# Patient Record
Sex: FEMALE | Race: WHITE | NOT HISPANIC OR LATINO | Employment: OTHER | URBAN - METROPOLITAN AREA
[De-identification: names, ages, dates, MRNs, and addresses within clinical notes are randomized per-mention and may not be internally consistent; named-entity substitution may affect disease eponyms.]

---

## 2019-11-08 ENCOUNTER — OFFICE VISIT (OUTPATIENT)
Dept: MEDICAL GROUP | Facility: MEDICAL CENTER | Age: 64
End: 2019-11-08
Payer: COMMERCIAL

## 2019-11-08 VITALS
RESPIRATION RATE: 16 BRPM | DIASTOLIC BLOOD PRESSURE: 78 MMHG | SYSTOLIC BLOOD PRESSURE: 126 MMHG | BODY MASS INDEX: 27.32 KG/M2 | WEIGHT: 164 LBS | HEIGHT: 65 IN | HEART RATE: 78 BPM | OXYGEN SATURATION: 100 % | TEMPERATURE: 97.1 F

## 2019-11-08 DIAGNOSIS — Z00.00 ANNUAL PHYSICAL EXAM: ICD-10-CM

## 2019-11-08 DIAGNOSIS — R06.83 SNORING: ICD-10-CM

## 2019-11-08 DIAGNOSIS — M79.645 FINGER PAIN, LEFT: ICD-10-CM

## 2019-11-08 DIAGNOSIS — Z12.31 SCREENING MAMMOGRAM, ENCOUNTER FOR: ICD-10-CM

## 2019-11-08 DIAGNOSIS — Z23 NEED FOR VACCINATION: ICD-10-CM

## 2019-11-08 DIAGNOSIS — Z11.59 ENCOUNTER FOR HEPATITIS C SCREENING TEST FOR LOW RISK PATIENT: ICD-10-CM

## 2019-11-08 DIAGNOSIS — M85.80 OSTEOPENIA, UNSPECIFIED LOCATION: ICD-10-CM

## 2019-11-08 DIAGNOSIS — M25.442 SWELLING OF FINGER JOINT OF LEFT HAND: ICD-10-CM

## 2019-11-08 PROCEDURE — 99214 OFFICE O/P EST MOD 30 MIN: CPT | Performed by: NURSE PRACTITIONER

## 2019-11-08 SDOH — HEALTH STABILITY: MENTAL HEALTH: HOW MANY STANDARD DRINKS CONTAINING ALCOHOL DO YOU HAVE ON A TYPICAL DAY?: 1 OR 2

## 2019-11-08 SDOH — HEALTH STABILITY: MENTAL HEALTH: HOW OFTEN DO YOU HAVE A DRINK CONTAINING ALCOHOL?: 4 OR MORE TIMES A WEEK

## 2019-11-08 ASSESSMENT — PATIENT HEALTH QUESTIONNAIRE - PHQ9: CLINICAL INTERPRETATION OF PHQ2 SCORE: 0

## 2019-11-08 NOTE — ASSESSMENT & PLAN NOTE
Told she snores by her . Her sister and father have sleep apnea.     Used to get frequent headaches/migraines, stopped at age 52.     No daytime somnolence.     Only wakes at night to go to the bathroom, no frequent waking. Snoring doesn't wake her.

## 2019-11-08 NOTE — ASSESSMENT & PLAN NOTE
Ongoing for a few months, new to me today. Left middle PIP joint intermittently swollen and painful. No redness, not hot to touch. No family history of RA. Does have raynaud's disease and family history of autoimmune diseases.

## 2019-11-09 NOTE — PROGRESS NOTES
"Subjective:   Mey Araujo is a 64 y.o. female here today to establish care and discuss the following:    Finger pain, left  Ongoing for a few months, new to me today. Left middle PIP joint intermittently swollen and painful. No redness, not hot to touch. No family history of RA. Does have raynaud's disease and family history of autoimmune diseases.     Snoring  Told she snores by her . Her sister and father have sleep apnea.     Used to get frequent headaches/migraines, stopped at age 52.     No daytime somnolence.     Only wakes at night to go to the bathroom, no frequent waking. Snoring doesn't wake her.        Current medicines (including changes today)  Current Outpatient Medications   Medication Sig Dispense Refill   • Zoster Vac Recomb Adjuvanted (SHINGRIX) 50 MCG/0.5ML Recon Susp 0.5 mL by Intramuscular route Once for 1 dose. 0.5 mL 0   • albuterol (VENTOLIN OR PROVENTIL) 108 (90 BASE) MCG/ACT Aero Soln inhalation aerosol Inhale 2 Puffs by mouth every 6 hours as needed for Shortness of Breath. 8.5 g 3     No current facility-administered medications for this visit.      She  has no past medical history on file.    ROS   No chest pain, no shortness of breath, no abdominal pain  Positive ROS as per HPI.  All other systems reviewed and are negative.     Objective:     /78 (BP Location: Right arm, Patient Position: Sitting, BP Cuff Size: Adult)   Pulse 78   Temp 36.2 °C (97.1 °F) (Temporal)   Resp 16   Ht 1.638 m (5' 4.5\")   Wt 74.4 kg (164 lb)   SpO2 100%  Body mass index is 27.72 kg/m².     Physical Exam:  Constitutional: Alert, no distress.  Skin: Warm, dry, good turgor, no rashes in visible areas.  Eye: Equal, round, conjunctiva clear, lids normal.  ENMT: Lips without lesions, good dentition  Neck: Trachea midline  Respiratory: Unlabored respiratory effort  Cardiovascular: No edema.  Psych: Alert and oriented x3, normal affect and mood.  MSK    Assessment and Plan:   The following " treatment plan was discussed    1. Snoring  Unstable  Check apnea link    2. Finger pain, left  Unstable  Check labs  OA vs RA  - URIC ACID; Future  - RHEUMATOID ARTHRITIS FACTOR; Future  - CCP ANTIBODY; Future  - WESTERGREN SED RATE; Future  - CRP QUANTITIVE (NON-CARDIAC); Future    3. Swelling of finger joint of left hand  As above,check labs  Slight deformity of left middle finger PIP joint  - URIC ACID; Future  - RHEUMATOID ARTHRITIS FACTOR; Future  - CCP ANTIBODY; Future  - WESTERGREN SED RATE; Future  - CRP QUANTITIVE (NON-CARDIAC); Future    4. Osteopenia, unspecified location  Bone density done in 2007 had normal T scores but stated that patient is osteopenic.   Unsure if T score was accidentally reported as a positive result and not negative or if report was abnormal.   Recheck DEXA, patient went through menopause in her 20s, does not take vit d or calcium.   - DS-BONE DENSITY STUDY (DEXA); Future    5. Annual physical exam  - CBC WITH DIFFERENTIAL; Future  - Comp Metabolic Panel; Future  - Lipid Profile; Future  - TSH WITH REFLEX TO FT4; Future  - VITAMIN D,25 HYDROXY; Future  - URIC ACID; Future  - RHEUMATOID ARTHRITIS FACTOR; Future  - CCP ANTIBODY; Future  - WESTERGREN SED RATE; Future  - CRP QUANTITIVE (NON-CARDIAC); Future    6. Encounter for hepatitis C screening test for low risk patient  - HEP C VIRUS ANTIBODY; Future    7. Screening mammogram, encounter for  - MA-SCREEN MAMMO W/CAD-BILAT; Future    8. Need for vaccination  - Zoster Vac Recomb Adjuvanted (SHINGRIX) 50 MCG/0.5ML Recon Susp; 0.5 mL by Intramuscular route Once for 1 dose.  Dispense: 0.5 mL; Refill: 0      Followup: Return in about 4 weeks (around 12/6/2019).    I have placed the below orders and discussed them with an approved delegating provider. The MA is performing the below orders under the direction of Dr. Santo

## 2019-12-06 ENCOUNTER — OFFICE VISIT (OUTPATIENT)
Dept: MEDICAL GROUP | Facility: MEDICAL CENTER | Age: 64
End: 2019-12-06
Payer: COMMERCIAL

## 2019-12-06 VITALS
TEMPERATURE: 96.6 F | SYSTOLIC BLOOD PRESSURE: 118 MMHG | HEART RATE: 80 BPM | BODY MASS INDEX: 27.66 KG/M2 | DIASTOLIC BLOOD PRESSURE: 78 MMHG | WEIGHT: 166 LBS | RESPIRATION RATE: 16 BRPM | HEIGHT: 65 IN | OXYGEN SATURATION: 98 %

## 2019-12-06 DIAGNOSIS — Z23 NEED FOR VACCINATION: ICD-10-CM

## 2019-12-06 DIAGNOSIS — E55.9 VITAMIN D INSUFFICIENCY: ICD-10-CM

## 2019-12-06 DIAGNOSIS — G47.34 NOCTURNAL HYPOXIA: ICD-10-CM

## 2019-12-06 DIAGNOSIS — R06.83 SNORING: ICD-10-CM

## 2019-12-06 PROCEDURE — 90715 TDAP VACCINE 7 YRS/> IM: CPT | Performed by: NURSE PRACTITIONER

## 2019-12-06 PROCEDURE — 99214 OFFICE O/P EST MOD 30 MIN: CPT | Mod: 25 | Performed by: NURSE PRACTITIONER

## 2019-12-06 PROCEDURE — 90471 IMMUNIZATION ADMIN: CPT | Performed by: NURSE PRACTITIONER

## 2019-12-06 NOTE — PROGRESS NOTES
"Subjective:   Mey Araujo is a 64 y.o. female here today for lab review and to overnight oximetry review:    Nocturnal hypoxia  Recent overnight pulse ox showed saturations decreased, as low as 68%.   Time less than 89% SpO2 43%, less than 88% SpO2 27%.     Possible sleep apnea vs other sleep disturbance. Needs referral to sleep medicine.     Both her father and brother have diagnosed sleep apnea.     Vitamin D insufficiency  Recent labs show Vit D level of 27. Not currently taking a vitamin d or calcium supplement. Does have a history of osteopenia on DEXA.       Current medicines (including changes today)  Current Outpatient Medications   Medication Sig Dispense Refill   • albuterol (VENTOLIN OR PROVENTIL) 108 (90 BASE) MCG/ACT Aero Soln inhalation aerosol Inhale 2 Puffs by mouth every 6 hours as needed for Shortness of Breath. 8.5 g 3     No current facility-administered medications for this visit.      She  has a past medical history of Osteopenia (11/8/2019).    ROS   No chest pain, no shortness of breath, no abdominal pain  Positive ROS as per HPI.  All other systems reviewed and are negative.     Objective:     /78 (BP Location: Right arm, Patient Position: Sitting, BP Cuff Size: Adult)   Pulse 80   Temp 35.9 °C (96.6 °F) (Temporal)   Resp 16   Ht 1.638 m (5' 4.5\")   Wt 75.3 kg (166 lb)   SpO2 98%  Body mass index is 28.05 kg/m².     Physical Exam:  Constitutional: Alert, no distress.  Skin: Warm, dry, good turgor, no rashes in visible areas.  Eye: Equal, round , conjunctiva clear, lids normal.  ENMT: Lips without lesions, good dentition  Neck: Trachea midline  Respiratory: Unlabored respiratory effort  Cardiovascular: No edema.  Psych: Alert and oriented x3, normal affect and mood.      Assessment and Plan:   The following treatment plan was discussed    1. Nocturnal hypoxia  Unstable  Follow-up with sleep specialist for sleep study  - REFERRAL TO SLEEP STUDIES    2. " Snoring  Unstable  Follow-up with sleep specialist for sleep study  - REFERRAL TO SLEEP STUDIES    3. Need for vaccination  - Tdap =>6yo IM    4. Vitamin D insufficiency  Unstable  Advised starting daily vitamin D and calcium supplement due to postmenopausal status and history of osteopenia.      Followup: Return in about 4 weeks (around 1/3/2020) for Pap Smear.    I have placed the below orders and discussed them with an approved delegating provider. The MA is performing the below orders under the direction of Dr. Santo

## 2019-12-06 NOTE — ASSESSMENT & PLAN NOTE
Recent overnight pulse ox showed saturations decreased, as low as 68%.   Time less than 89% SpO2 43%, less than 88% SpO2 27%.     Possible sleep apnea vs other sleep disturbance. Needs referral to sleep medicine.     Both her father and brother have diagnosed sleep apnea.

## 2019-12-06 NOTE — ASSESSMENT & PLAN NOTE
Recent labs show Vit D level of 27. Not currently taking a vitamin d or calcium supplement. Does have a history of osteopenia on DEXA.

## 2019-12-17 ENCOUNTER — OFFICE VISIT (OUTPATIENT)
Dept: MEDICAL GROUP | Facility: MEDICAL CENTER | Age: 64
End: 2019-12-17
Payer: COMMERCIAL

## 2019-12-17 VITALS
SYSTOLIC BLOOD PRESSURE: 128 MMHG | RESPIRATION RATE: 16 BRPM | WEIGHT: 162 LBS | TEMPERATURE: 97 F | OXYGEN SATURATION: 99 % | BODY MASS INDEX: 27.38 KG/M2 | HEART RATE: 63 BPM | DIASTOLIC BLOOD PRESSURE: 82 MMHG

## 2019-12-17 DIAGNOSIS — Z12.4 PAP SMEAR FOR CERVICAL CANCER SCREENING: ICD-10-CM

## 2019-12-17 PROCEDURE — 99396 PREV VISIT EST AGE 40-64: CPT | Performed by: NURSE PRACTITIONER

## 2019-12-17 NOTE — PROGRESS NOTES
SUBJECTIVE: 64 y.o. female for annual routine gynecologic exam  Chief Complaint   Patient presents with   • Gynecologic Exam       OB History    Para Term  AB Living   0 0 0 0 0 0   SAB TAB Ectopic Molar Multiple Live Births   0 0 0 0 0 0      Last Pap: 2014  Social History     Substance and Sexual Activity   Sexual Activity Not on file     Sexual history: currently sexually active, single partner, contraceptive--post menopausal status   H/O Abnormal Pap no  She  reports that she has never smoked. She has never used smokeless tobacco.        Allergies: Pcn [penicillins] and Hydrocodone     ROS:    Reports no menopause symptoms of hot flashes, night sweats, sleep disruption, mood changes.Denies vaginal dryness.     No significant bloating/fluid retention, pelvic pain, or dyspareunia. No vaginal discharge   No breast tenderness, dimpling, mass, nipple discharge, skin changes, changes in size or contour, or abnormal cyclic discomfort.  No urinary tract symptoms, no incontinence, no polydipsia, polyuria,  No abdominal pain, change in bowel habits, black or bloody stools.    No unusual headaches, no visual changes, menstrual migraines   No prolonged cough. No dyspnea or chest pain on exertion.  No depression, labile mood, anxiety, libido changes, insomnia.  No temperature intolerance.  No new/concerning skin lesions, concerns.    Exercise: sporadic irregular exercise  Preventive Care: Up to date    Current medicines (including changes today)  Current Outpatient Medications   Medication Sig Dispense Refill   • albuterol (VENTOLIN OR PROVENTIL) 108 (90 BASE) MCG/ACT Aero Soln inhalation aerosol Inhale 2 Puffs by mouth every 6 hours as needed for Shortness of Breath. 8.5 g 3     No current facility-administered medications for this visit.      She  has a past medical history of Osteopenia (2019).  She  has a past surgical history that includes nephrolithotomy and tonsillectomy and adenoidectomy.      Family History:   Family History   Problem Relation Age of Onset   • Cancer Father         prostate ca, skin cancer.    • Lung Disease Father         pulmonary fibrosis   • Other Sister         sleep apnea   • Psychiatric Illness Sister         depression       OBJECTIVE:   /82 (BP Location: Right arm, Patient Position: Sitting, BP Cuff Size: Adult)   Pulse 63   Temp 36.1 °C (97 °F) (Temporal)   Resp 16   Wt 73.5 kg (162 lb)   SpO2 99%   BMI 27.38 kg/m²   Body mass index is 27.38 kg/m².    General/Constitutional: Vitals as above, Well nourished, well developed female in no acute distress  HEAD AND NECK:  Ears normal.  Throat, oral cavity and tongue normal.  Neck supple. No adenopathy or masses in the neck or supraclavicular regions.  No carotid bruits. No thyromegaly.   PSYCH: Judgment grossly appropriate, no apparent depression/anxiety  NEURO: Cranial nerves are normal. DTR's normal and symmetric.    CHEST:  Clear, good air entry, no wheezes or rales.   HEART:  Regular rate and rhythm.  S1 and S2 normal.  No edema or JVD.   ABDOMEN:  Soft without tenderness, guarding, mass or organomegaly.  No CVA tenderness or inguinal adenopathy.   Genitourinary: Grossly normal external female genitalia with skin within normal limits,   EXTREMITIES:  Extremities, reflexes and peripheral pulses are normal.   SKIN: color normal, vascularity normal, no edema, temperature normal   No rashes or suspicious skin lesions noted.     Breast Exam: Symmetrical, normal consistency without masses., No dimpling or skin changes, Normal nipples without discharge, no axillary lymphadenopathy  Pelvic Exam -  Normal external genitalia with no lesions. Vaginal Mucosa:  atrophic vaginal mucosa, normal discharge . no adnexal masses or tenderness, no cervical motion tenderness, no uterine tenderness, small speculum used with warm water as lubrication, Cervix well visualized with no discharge/friability/bleeding, cervical broom used to  obtain cervical specimen, post-specimen collection demonstrated slight bleeding, Cervix with no visible lesions. Thin Prep Pap is obtained, vaginal swab is not obtained and specimen(s) sent to lab  Rectal: deferred    <ASSESSMENT and PLAN>  1. Pap smear for cervical cancer screening  Pap complete, call with results  Labs and apnea link reviewed, she will increase vitamin D and calcium Intake daily  Apt scheduled with [facility] pulmonology in April  Mammogram and Dexa scheduled in December.   THINPREP PAP WITH HPV       Discussed  breast self exam, mammography screening, adequate intake of calcium and vitamin D, diet and exercise   Follow-up in 5 years for next Gyn exam and 5 years for next Pap.   Next office visit for recheck of chronic medical conditions is due in  1 year

## 2020-01-30 ENCOUNTER — HOSPITAL ENCOUNTER (OUTPATIENT)
Dept: RADIOLOGY | Facility: MEDICAL CENTER | Age: 65
End: 2020-01-30
Attending: NURSE PRACTITIONER
Payer: COMMERCIAL

## 2020-01-30 DIAGNOSIS — M85.80 OSTEOPENIA, UNSPECIFIED LOCATION: ICD-10-CM

## 2020-01-30 DIAGNOSIS — Z12.31 SCREENING MAMMOGRAM, ENCOUNTER FOR: ICD-10-CM

## 2020-01-30 PROCEDURE — 77080 DXA BONE DENSITY AXIAL: CPT

## 2020-01-30 PROCEDURE — 77067 SCR MAMMO BI INCL CAD: CPT

## 2020-03-11 ENCOUNTER — PATIENT MESSAGE (OUTPATIENT)
Dept: MEDICAL GROUP | Facility: MEDICAL CENTER | Age: 65
End: 2020-03-11

## 2020-03-11 DIAGNOSIS — A60.04 HERPES SIMPLEX VULVOVAGINITIS: ICD-10-CM

## 2020-03-11 RX ORDER — VALACYCLOVIR HYDROCHLORIDE 500 MG/1
500 TABLET, FILM COATED ORAL 2 TIMES DAILY
Qty: 18 TAB | Refills: 1 | Status: SHIPPED | OUTPATIENT
Start: 2020-03-11 | End: 2020-03-17 | Stop reason: SDUPTHER

## 2020-03-17 ENCOUNTER — TELEPHONE (OUTPATIENT)
Dept: MEDICAL GROUP | Facility: MEDICAL CENTER | Age: 65
End: 2020-03-17

## 2020-03-17 DIAGNOSIS — A60.04 HERPES SIMPLEX VULVOVAGINITIS: ICD-10-CM

## 2020-03-17 RX ORDER — VALACYCLOVIR HYDROCHLORIDE 500 MG/1
500 TABLET, FILM COATED ORAL 2 TIMES DAILY
Qty: 18 TAB | Refills: 1 | Status: SHIPPED | OUTPATIENT
Start: 2020-03-17 | End: 2021-02-22

## 2020-03-17 NOTE — TELEPHONE ENCOUNTER
----- Message from Sheri Glasgow, Med Ass't sent at 3/16/2020  1:58 PM PDT -----  Regarding: FW: Prescription Question  Contact: [phone]    ----- Message -----  From: Mey yHde  Sent: 3/16/2020  12:44 PM PDT  To: Pilar Tang  Subject: Prescription Question                            Hi, I'm not sure you got my last message. I couldn't  the Valcyclovir from [facility] as they are not a [facility] provider and the RX was $150!  Please call it in to [facility].  Thank you,  Ludmila Hyde

## 2020-05-12 PROBLEM — Z78.9 NON-SMOKER: Status: ACTIVE | Noted: 2020-05-12

## 2020-05-12 PROBLEM — G47.33 OSA (OBSTRUCTIVE SLEEP APNEA): Status: ACTIVE | Noted: 2020-05-12

## 2020-05-12 NOTE — PROGRESS NOTES
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  She had a sleep study by [facility] in 2010 which showed an AHI of 6.8.  A November 2019 overnight oximetry demonstrated saturations less than 90% for 43.5% of the recording with a bijal saturation of 68%.  Her oxygen desaturation index was 16.6.      Significant comorbidities and modifying factors include never smoker, near normal BMI, kidney stones, and pneumonia.      Symptom Summary:  Snoring: +  Very loud snoring: +  Witnessed apneas: +  Resuscitative snorts: +  Nocturnal shortness of breath: +  Non-restorative sleep: +  Insomnia: +  Nocturnal awakenings: +  Nocturia: +  EDS: +  Fatigue: +  Tiredness: +  Falls asleep accidentally: +   Napping or returning to bed after arising: +  Restless legs: -  Limb movements during sleep: +  Nocturnal headaches: +          Patient Active Problem List    Diagnosis Date Noted   • Nocturnal hypoxia 12/06/2019   • Vitamin D insufficiency 12/06/2019   • Snoring 11/08/2019   • Finger pain, left 11/08/2019   • Swelling of finger joint of left hand 11/08/2019   • Osteopenia 11/08/2019   • Preventative health care 04/06/2016   • History of kidney stones 04/06/2016   • Headache 03/10/2016   • History of pneumonia 03/10/2016       Past Medical History:   Diagnosis Date   • Osteopenia 11/8/2019        Past Surgical History:   Procedure Laterality Date   • NEPHROLITHOTOMY     • TONSILLECTOMY AND ADENOIDECTOMY         Family History   Problem Relation Age of Onset   • Cancer Father         prostate ca, skin cancer.    • Lung Disease Father         pulmonary fibrosis   • Other Sister         sleep apnea   • Psychiatric Illness Sister         depression       Social History     Socioeconomic History   • Marital status:      Spouse name: Not on file   • Number of children: Not on file   • Years of education: Not  "on file   • Highest education level: Not on file   Occupational History   • Not on file   Social Needs   • Financial resource strain: Not on file   • Food insecurity     Worry: Not on file     Inability: Not on file   • Transportation needs     Medical: Not on file     Non-medical: Not on file   Tobacco Use   • Smoking status: Never Smoker   • Smokeless tobacco: Never Used   Substance and Sexual Activity   • Alcohol use: Yes     Frequency: 4 or more times a week     Drinks per session: 1 or 2     Comment: 2 glasses wine 3 d/wk   • Drug use: No   • Sexual activity: Not on file   Lifestyle   • Physical activity     Days per week: Not on file     Minutes per session: Not on file   • Stress: Not on file   Relationships   • Social connections     Talks on phone: Not on file     Gets together: Not on file     Attends [employer] service: Not on file     Active member of club or organization: Not on file     Attends meetings of clubs or organizations: Not on file     Relationship status: Not on file   • Intimate partner violence     Fear of current or ex partner: Not on file     Emotionally abused: Not on file     Physically abused: Not on file     Forced sexual activity: Not on file   Other Topics Concern   • Not on file   Social History Narrative   • Not on file       Current Outpatient Medications   Medication Sig Dispense Refill   • valACYclovir (VALTREX) 500 MG Tab Take 1 Tab by mouth 2 times a day. For 3 days at the start of outbreak. 18 Tab 1   • albuterol (VENTOLIN OR PROVENTIL) 108 (90 BASE) MCG/ACT Aero Soln inhalation aerosol Inhale 2 Puffs by mouth every 6 hours as needed for Shortness of Breath. 8.5 g 3     No current facility-administered medications for this visit.     \"CURRENT RX\"    ALLERGIES: Pcn [penicillins] and Hydrocodone    ROS  ***  Constitutional: Denies fever, chills, sweats,  weight loss, fatigue.  Eyes: Denies vision loss, pain, drainage, double vision, glasses.  Ears/Nose/Mouth/Throat: Denies " earache, difficulty hearing, rhinitis/nasal congestion, injury, recurrent sore throat, persistent hoarseness, decayed teeth/toothaches, ringing or buzzing in the ears.  Cardiovascular: Denies chest pain, tightness, palpitations, swelling in legs/feet, fainting, difficulty breathing when lying down but gets better when sitting up.   Respiratory: Denies shortness of breath, cough, sputum, wheezing, painful breathing, coughing up blood.   Sleep: per HPI  Gastrointestinal: Denies  difficulty swallowing, nausea, abdominal pain, diarrhea, constipation, heartburn.  Genitourinary: Denies  blood in urine, discharge, frequent urination.   Musculoskeletal: Denies painful joints, sore muscles, back pain.   Integumentary: Denies rashes, lumps, color changes.   Neurological: Denies frequent headaches,weakness, dizziness.    PHYSICAL EXAM  ***    There were no vitals taken for this visit.  Appearance: Well-nourished, well-developed, no acute distress  Eyes:  PERRLA, EOMI  ENMT: without lesions, deformities;hearing grossly intact; tongue normal, posterior pharynx without erythema or exudate;   Modified Mallampati (AKA Vang) Score:  Neck: Supple, trachea midline, no masses  Respiratory effort:  No intercostal retractions or use of accessory muscles  Lung auscultation:  No wheezes rhonchi rubs or rales  Cardiac: No murmurs, rubs, or gallops; regular rhythm, normal rate; no edema  Abdomen:  No tenderness, no organomegaly  Musculoskeletal:  Grossly normal; gait and station normal; digits and nails normal  Skin:  No rashes, petechiae, cyanosis  Neurologic: without focal signs; oriented to person, time, place, and purpose; judgement intact  Psychiatric:  No depression, anxiety, agitation        PROBLEMS:  ***  There are no diagnoses linked to this encounter.    PLAN:   The patient has signs and symptoms consistent with obstructive sleep apnea hypopnea syndrome. Will schedule to have a nocturnal polysomnogram using zolpidem to assist  with sleep onset and maintenance should the need arise. Will return after the results are available to determine further diagnostic needs and/or treatment options.    The risks of untreated sleep apnea were discussed with the patient at length. Patients with DONN are at increased risk of cardiovascular disease including coronary artery disease, systemic arterial hypertension, pulmonary arterial hypertension, cardiac arrythmias, and stroke. DONN patients have an increased risk of motor vehicle accidents, type 2 diabetes, chronic kidney disease, and non-alcoholic liver disease. The patient was advised to avoid driving a motor vehicle when drowsy.    Positive airway pressure, such as CPAP, is considered first-line and preferred therapy for sleep apnea and may reverse both symptoms and risks.    Have advised the patient to follow up with the appropriate healthcare practitioners for all other medical problems and issues.        ***  No follow-ups on file.

## 2020-05-14 NOTE — PROGRESS NOTES
"Telemedicine Visit: New Patient     This encounter was conducted via Zoom .   Verbal consent was obtained. Patient's identity was verified.    Subjective:     CC: \"Snoring\"    HPI:  Ms. 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  She had a sleep study by [facility] in 2010 which showed an AHI of 6.8.  A November 2019 overnight oximetry demonstrated saturations less than 90% for 43.5% of the recording with a bijal saturation of 68%.  Her oxygen desaturation index was 16.6.    The patient describes her sleep problem as \"per  I stop breathing.  Snoring almost every night for about 5 years.\"    She generally goes to bed at 11 PM and gets up at 7 AM.  She has a regular bed partner.  Her sleep latency is generally about 2 minutes.  She may read, watch TV or play games on her phone just prior to turn out the lights and attempting to go to sleep.  She experiences 1-2 nocturnal awakenings and reports one episode of nocturia each night.    Symptoms include sometimes difficulty awakening and getting out of bed after sleeping, napping or returning to bed after arising, and tiredness during the day.  She is a loud enough snorer to disturb her .  She reports infrequent restless legs relieved by walking, sleep talking, sleepwalking, and waking up screaming or seemingly afraid.  She reports seeing things like spiders or intruders.    Her father has pulmonary fibrosis and is on oxygen 24/7 as well as CPAP at night..    She may fall asleep accidentally when watching TV.    Her total [place] score is a normal 7 out of 24.    Review of her sleep diary shows napping for of 7 days but she awakened feeling \"good\" each and every morning.    Significant comorbidities and modifying factors include never smoker, near normal BMI, kidney stones, allergic rhinitis, obesity, osteopenia, postnasal drip, and pneumonia " 2018.        ROS  See HPI  Constitutional: Negative for fever, chills and malaise/fatigue.   HENT: Positive for rhinitis or nasal congestion  Eyes: Negative for pain.   Respiratory: Negative for cough and shortness of breath.    Cardiovascular: Negative for leg swelling.   Gastrointestinal: Negative for nausea, vomiting, abdominal pain and diarrhea.   Genitourinary: Negative for dysuria and hematuria.   Skin: Negative for rash.   Neurological: Negative for dizziness, focal weakness and headaches.   Endo/Heme/Allergies: Does not bruise/bleed easily.   Psychiatric/Behavioral: Negative for depression.  The patient is not nervous/anxious.      Allergies   Allergen Reactions   • Pcn [Penicillins] Rash     Rash     • Hydrocodone      Itchy nose         Current medicines (including changes today)  Current Outpatient Medications   Medication Sig Dispense Refill   • valACYclovir (VALTREX) 500 MG Tab Take 1 Tab by mouth 2 times a day. For 3 days at the start of outbreak. 18 Tab 1   • albuterol (VENTOLIN OR PROVENTIL) 108 (90 BASE) MCG/ACT Aero Soln inhalation aerosol Inhale 2 Puffs by mouth every 6 hours as needed for Shortness of Breath. 8.5 g 3     No current facility-administered medications for this visit.        She  has a past medical history of Osteopenia (2019).  She  has a past surgical history that includes nephrolithotomy and tonsillectomy and adenoidectomy.      Family History   Problem Relation Age of Onset   • Cancer Father         prostate ca, skin cancer.    • Lung Disease Father         pulmonary fibrosis   • Other Sister         sleep apnea   • Psychiatric Illness Sister         depression     Family Status   Relation Name Status   • Mo suicide 29yo    • Fa  Alive   • Sis sleep apnea/depression Alive   • Sis  Alive   • Laurie adopted Alive       Patient Active Problem List    Diagnosis Date Noted   • DONN (obstructive sleep apnea) 2020   • Non-smoker 2020   • Nocturnal hypoxia 2019    • Vitamin D insufficiency 12/06/2019   • Snoring 11/08/2019   • Finger pain, left 11/08/2019   • Swelling of finger joint of left hand 11/08/2019   • Osteopenia 11/08/2019   • Preventative health care 04/06/2016   • History of kidney stones 04/06/2016   • Headache 03/10/2016   • History of pneumonia 03/10/2016          Objective:   Vitals obtained by patient:  rr 14  Weight 73.5 kg    Physical Exam:  Constitutional: Alert, no distress, well-groomed.  Skin: No rashes in visible areas.  Eye: Round. Conjunctiva clear, lids normal. No icterus. Glasses.  ENMT: Lips pink without lesions, good dentition, moist mucous membranes. Phonation normal.  Neck: No masses, no thyromegaly. Moves freely without pain.  CV: Pulse as reported by patient  Respiratory: Unlabored respiratory effort, no cough or audible wheeze  Psych: Alert and oriented x3, normal affect and mood.       Assessment and Plan:   The patient has signs and symptoms consistent with obstructive sleep apnea hypopnea syndrome. Will schedule to have a nocturnal polysomnogram using zolpidem to assist with sleep onset and maintenance should the need arise. Will return after the results are available to determine further diagnostic needs and/or treatment options.    The risks of untreated sleep apnea were discussed with the patient at length. Patients with DONN are at increased risk of cardiovascular disease including coronary artery disease, systemic arterial hypertension, pulmonary arterial hypertension, cardiac arrythmias, and stroke. DONN patients have an increased risk of motor vehicle accidents, type 2 diabetes, chronic kidney disease, and non-alcoholic liver disease. The patient was advised to avoid driving a motor vehicle when drowsy.    Positive airway pressure, such as CPAP, is considered first-line and preferred therapy for sleep apnea and may reverse both symptoms and risks.    Have advised the patient to follow up with the appropriate healthcare  practitioners for all other medical problems and issues.      The following treatment plan was discussed:     1. DONN (obstructive sleep apnea)  - zolpidem (AMBIEN) 5 MG Tab; Take 1 Tab by mouth at bedtime as needed for Sleep (1 to 2 po qhs prn insomnia/sleep study. Bring to sleep study.) for up to 2 doses.  Dispense: 2 Tab; Refill: 0  - Polysomnography, 4 or More; Future    2. Snoring    3. Nocturnal hypoxia    4. History of pneumonia    5. History of kidney stones    6. Non-smoker    7. Body mass index is 28.84 kg/m².  My my my my    Follow-up: Return for after sleep study.

## 2020-05-15 ENCOUNTER — PATIENT MESSAGE (OUTPATIENT)
Dept: MEDICAL GROUP | Facility: MEDICAL CENTER | Age: 65
End: 2020-05-15

## 2020-05-15 ENCOUNTER — SLEEP CENTER VISIT (OUTPATIENT)
Dept: SLEEP MEDICINE | Facility: MEDICAL CENTER | Age: 65
End: 2020-05-15
Payer: COMMERCIAL

## 2020-05-15 VITALS — WEIGHT: 168 LBS | BODY MASS INDEX: 28.68 KG/M2 | HEIGHT: 64 IN

## 2020-05-15 DIAGNOSIS — R06.83 SNORING: ICD-10-CM

## 2020-05-15 DIAGNOSIS — Z87.442 HISTORY OF KIDNEY STONES: ICD-10-CM

## 2020-05-15 DIAGNOSIS — Z87.01 HISTORY OF PNEUMONIA: ICD-10-CM

## 2020-05-15 DIAGNOSIS — G47.33 OSA (OBSTRUCTIVE SLEEP APNEA): ICD-10-CM

## 2020-05-15 DIAGNOSIS — Z78.9 NON-SMOKER: ICD-10-CM

## 2020-05-15 DIAGNOSIS — G47.34 NOCTURNAL HYPOXIA: ICD-10-CM

## 2020-05-15 PROCEDURE — 99203 OFFICE O/P NEW LOW 30 MIN: CPT | Mod: 95,CR | Performed by: INTERNAL MEDICINE

## 2020-05-15 RX ORDER — ZOLPIDEM TARTRATE 5 MG/1
5 TABLET ORAL NIGHTLY PRN
Qty: 2 TAB | Refills: 0 | Status: SHIPPED | OUTPATIENT
Start: 2020-05-15 | End: 2020-06-15

## 2020-05-20 ENCOUNTER — TELEPHONE (OUTPATIENT)
Dept: MEDICAL GROUP | Facility: MEDICAL CENTER | Age: 65
End: 2020-05-20

## 2020-05-20 DIAGNOSIS — Z12.4 PAP SMEAR FOR CERVICAL CANCER SCREENING: ICD-10-CM

## 2020-05-20 DIAGNOSIS — G47.34 NOCTURNAL HYPOXIA: ICD-10-CM

## 2020-05-20 NOTE — TELEPHONE ENCOUNTER
1. Caller Name: [facility] Histology Department - [facility]  Call Back Number: [phone]    Spoke with Leela who stated that pap should have been forwarded to [facility] due to patient's insurance at the time ([employer]). It was not received at a [facility] Lab.      1. Caller Name: Allison Coyle  Call Back Number: [phone]    Contacted [facility] who faxed the results, see . Representative verbally stated pap was negative. Please advise.

## 2020-05-20 NOTE — TELEPHONE ENCOUNTER
Great, thanks for checking on that. Can you attach the report to Sheri's order so we can satisfy HM? Thanks

## 2020-05-20 NOTE — TELEPHONE ENCOUNTER
Regarding: RE: Non-Urgent Medical Question  Contact: [phone]  ----- Message from FRANCY Dickson sent at 5/19/2020  4:35 PM PDT -----       ----- Message sent from FRANCY Dickson to Mey Araujo at 5/19/2020  4:35 PM -----   I did some digging - it looks like this was collected and sent to the lab but never resulted (this is why it still shows that you are due in [facility]). I will send  amessage to the [facility] and see if they can contact the lab.     MARIANGEL Trinidad  Family Medicine   Covering for FRANCY Gunn       ----- Message -----       From:Mey Araujo       Sent:5/19/2020  1:31 PM PDT         To:FRANCY Dickson    Subject:Non-Urgent Medical Question    Dr. Baum did it I think in December at the [facility] office.  Ludmila      ----- Message -----       From:FRANCY Dickson       Sent:5/19/2020  1:26 PM PDT         To:Mey Araujo    Subject:RE: Non-Urgent Medical Question    Myles Mathias,     When did you have the PAP done and where? I would like a copy for your chart.     MARIANGEL Trinidad  [facility]   Covering for FRANCY Gunn       ----- Message -----       From:Mey Araujo       Sent:5/15/2020  9:05 AM PDT         To:FRANCY Gunn    Subject:Non-Urgent Medical Question    Can you please enter my Pap smear date?  It says I still have to do it.      I saw Dr. Payton today via Zoom for my sleep study consult. I am going to be scheduled for an in-office sleep study, diagnose/treat soon.  Ludmila

## 2020-05-31 ENCOUNTER — SLEEP STUDY (OUTPATIENT)
Dept: SLEEP MEDICINE | Facility: MEDICAL CENTER | Age: 65
End: 2020-05-31
Attending: INTERNAL MEDICINE
Payer: COMMERCIAL

## 2020-05-31 DIAGNOSIS — G47.33 OSA (OBSTRUCTIVE SLEEP APNEA): ICD-10-CM

## 2020-05-31 PROCEDURE — 95811 POLYSOM 6/>YRS CPAP 4/> PARM: CPT | Performed by: INTERNAL MEDICINE

## 2020-06-02 NOTE — PROCEDURES
Clinical Comments:   The patient underwent a split night polysomnogram with a CPAP titration using the standard montage for measurement of paramaters of sleep, respiratory events, movement abnormalities, heart rate and rhythm. A Microphone was used to monitior snoring.          DIAGNOSTIC:  Analysis:  Study start time was 10:38:40 PM. Total recording time was 2h 28.5m (148 minutes) with a total sleep time of 2h 5.5m (125 minutes) resulting in a sleep efficiency of 84.51%.  Sleep latency from the start fo the study was 17 minutes minutes and REM latency from sleep onset was 00 minutes minutes.  Respiratory:   There were 65 apneas in total consisting of 62 obstructive apneas, 0 mixed apneas, and 3 central apneas. There were 59 hypopneas in total.  The apnea index was 31.08 per hour and the hypopnea index was 28.21 per hour.  The overall AHI was 59.3, with a REM AHI of 0.00, and a supine AHI of 0.00.  Limb Movements:  There were a total of 14 periodic leg movements, of which 9 were PLMS arousals. This resulted in a PLMS index of 6.7 and a PLMS arousal index of 4.3  Oximetry:  The mean SaO2 was 89.0% for the diagnostic portion of the study, with a minimum SaO2 of 73.0%.             TREATMENT:  Analysis:  Treatment recording time was 4h 23.0m (263 minutes) with a total sleep time of 3h 38.5m (218 minutes) resulting in a sleep efficiency of 83.1%.   Sleep latency from the start of treatment was 08 minutes minutes and REM latency from sleep onset was 0h 10.5m minutes.   The patient had 77 arousals in total for an arousal index of 21.1.  Respiratory:   There were 48 apneas in total consisting of 8 obstructive apneas, 40 central apneas, and 0 mixed apneas for an apnea index of 13.18.   The patient had 30 hypopneas in total, which resulted in a hypopnea index of 8.24.   The overall AHI was 21.42, with a REM AHI of 16.63, and a supine AHI of 13.42.   Limb Movements:  There were a total of 150 periodic leg movements, of which  39 were PLMS arousals. This resulted in a PLMS index of 41.2 and a PLMS arousal index of 10.7.  Oximetry:  The mean SaO2 during treatment was 95.0%, with a minimum oxygen saturation of 68.0%.      Interpretation:    Ms. Araujo presented with snoring, witnessed nocturnal apnea and nocturnal hypoxemia.  This is a split-night study.    In the diagnostic phase there is mild fragmentation of sleep with an increased arousal index and no REM sleep time was recorded.  There are frequent periodic limb movements and the periodic limb movement with arousal index is 16.7 events per hour.  The apnea hypopnea index is 59.3 events per hour including obstructive apnea and hypopnea events with very rare central apneas.  No supine sleep time was recorded.  The lowest arterial oxygen saturation is 73% on room air and she spends more than half of the diagnostic time with a saturation below 90%.  The heart rate varies from 62-91 beats per minutes.  Frequent moderate snoring was recorded.    In the treatment phase of the study there is again mild fragmentation of sleep but 50 minutes of REM sleep time are included.  The periodic limb movements persist and continue to contribute to sleep fragmentation.  CPAP was applied at 5 to 14 cm water pressure.  The higher pressures were used to address some persisting hypopnea events.  In the final pressure levels a number of central apnea episodes emerged, suggesting an overtreatment effect.  Overall there were 40 episodes of central apnea, 8 episodes of obstructive apnea and 30 episodes of hypopnea.  At a pressure of 11 cmH2O the apnea hypopnea index was 0 with a lowest arterial oxygen saturation of 93%.  That stage in the titration included 18 minutes of REM sleep time but no supine body position time.    Assessment:  Very severe obstructive sleep apnea hypopnea with an apnea hypopnea index of 59.3 events per hour.  Severe nocturnal hypoxemia with a lowest arterial oxygen saturation of 73% on  room air.  Fragmentation of sleep related to the breathing events and to periodic limb movements.  There is an excellent response to CPAP therapy.  Some treatment emergent central apneas are identified at the end of the CPAP titration suggesting an over titration effect but complex sleep apnea cannot be excluded.    Recommendations:  Clinical correlation is required.  If the patient is felt to be at high risk for complex sleep apnea dedicated titration polysomnography might be considered with the use of BiPAP or ASV therapy as needed.  The patient's history suggests obstructive sleep apnea hypopnea syndrome and CPAP at 11 cm of water should be effective.  She did best with a medium Damaris View mask.

## 2020-06-04 ENCOUNTER — TELEMEDICINE (OUTPATIENT)
Dept: SLEEP MEDICINE | Facility: MEDICAL CENTER | Age: 65
End: 2020-06-04
Payer: COMMERCIAL

## 2020-06-04 VITALS
SYSTOLIC BLOOD PRESSURE: 123 MMHG | BODY MASS INDEX: 29.02 KG/M2 | DIASTOLIC BLOOD PRESSURE: 91 MMHG | HEIGHT: 64 IN | HEART RATE: 71 BPM | WEIGHT: 170 LBS

## 2020-06-04 DIAGNOSIS — G47.33 OSA (OBSTRUCTIVE SLEEP APNEA): ICD-10-CM

## 2020-06-04 PROCEDURE — 99213 OFFICE O/P EST LOW 20 MIN: CPT | Mod: 95,CR | Performed by: NURSE PRACTITIONER

## 2020-06-04 NOTE — PROGRESS NOTES
"Telemedicine Visit: Established Patient     This encounter was conducted via Zoom .   Verbal consent was obtained. Patient's identity was verified.    Subjective:   CC: Sleep study results    Mey Araujo is a 65 y.o. female presenting today for sleep study results.     PSG split night study results from 5/31/20 were reviewed with the patient today which showed Very severe obstructive sleep apnea hypopnea with an apnea hypopnea index of 59.3 events per hour.  Severe nocturnal hypoxemia with a lowest arterial oxygen saturation of 73% on room air.  Fragmentation of sleep related to the breathing events and to periodic limb movements.  There is an excellent response to CPAP therapy.  Some treatment emergent central apneas are identified at the end of the CPAP titration suggesting an over titration effect but complex sleep apnea cannot be excluded. CPAP was applied at 5 to 14 cm water pressure.  The higher pressures were used to address some persisting hypopnea events.  In the final pressure levels a number of central apnea episodes emerged, suggesting an overtreatment effect.  Overall there were 40 episodes of central apnea, 8 episodes of obstructive apnea and 30 episodes of hypopnea.  At a pressure of 11 cmH2O the apnea hypopnea index was 0 with a lowest arterial oxygen saturation of 93%.       She goes to bed at 11 PM and wakes up between 6:30 and 7 AM.  She is able to sleep through the night without any problems.  She does take a nap almost every day between 12 and 2:30 for 1 hour.  She never feels tired and has plenty of energy throughout the day to complete her activities.  She denies morning headache per her  she does snore and gasps for air.  She does suffer from seasonal allergies and gets very itchy eyes, nose and throat.  She takes over-the-counter Claritin which \"takes the edge off\".  She stays active by walking 45 minutes 2-3 times a week, gardening, golfing and kayaking when able.  She denies " any heart problems or history of stroke.  She denies any new health problems or medications.      ROS   Denies any recent fevers or chills, sore throat. No nausea or vomiting, abd pain, diarrhea or constipation. No chest pains or shortness of breath.     Allergies   Allergen Reactions   • Pcn [Penicillins] Rash     Rash     • Hydrocodone      Itchy nose         Current medicines (including changes today)  Current Outpatient Medications   Medication Sig Dispense Refill   • Cholecalciferol (D3 ADULT PO) Take  by mouth.     • valACYclovir (VALTREX) 500 MG Tab Take 1 Tab by mouth 2 times a day. For 3 days at the start of outbreak. 18 Tab 1   • zolpidem (AMBIEN) 5 MG Tab Take 1 Tab by mouth at bedtime as needed for Sleep (1 to 2 po qhs prn insomnia/sleep study. Bring to sleep study.) for up to 2 doses. (Patient not taking: Reported on 6/4/2020) 2 Tab 0   • albuterol (VENTOLIN OR PROVENTIL) 108 (90 BASE) MCG/ACT Aero Soln inhalation aerosol Inhale 2 Puffs by mouth every 6 hours as needed for Shortness of Breath. (Patient not taking: Reported on 6/4/2020) 8.5 g 3     No current facility-administered medications for this visit.        Patient Active Problem List    Diagnosis Date Noted   • DONN (obstructive sleep apnea) 05/12/2020   • Non-smoker 05/12/2020   • Nocturnal hypoxia 12/06/2019   • Vitamin D insufficiency 12/06/2019   • Snoring 11/08/2019   • Finger pain, left 11/08/2019   • Swelling of finger joint of left hand 11/08/2019   • Osteopenia 11/08/2019   • Preventative health care 04/06/2016   • History of kidney stones 04/06/2016   • Headache 03/10/2016   • History of pneumonia 03/10/2016       Family History   Problem Relation Age of Onset   • Cancer Father         prostate ca, skin cancer.    • Lung Disease Father         pulmonary fibrosis   • Other Sister         sleep apnea   • Psychiatric Illness Sister         depression       She  has a past medical history of Allergic rhinitis, Kidney stone, and Osteopenia  "(11/8/2019).  She  has a past surgical history that includes nephrolithotomy; tonsillectomy and adenoidectomy; tonsillectomy; and sinuscope.       Objective:   /91 (BP Location: Right arm, Patient Position: Sitting, BP Cuff Size: Adult)   Pulse 71   Ht 1.626 m (5' 4\")   Wt 77.1 kg (170 lb)   BMI 29.18 kg/m²     Physical Exam:  Constitutional: Alert, no distress, well-groomed.  Skin: No rashes in visible areas.  Eye: Round. Conjunctiva clear, lids normal. No icterus.   ENMT: Lips pink without lesions, good dentition, moist mucous membranes. Phonation normal.  Neck: No masses, no thyromegaly. Moves freely without pain.  CV: Pulse as reported by patient  Respiratory: Unlabored respiratory effort, no cough or audible wheeze  Psych: Alert and oriented x3, normal affect and mood.       Assessment and Plan:   The following treatment plan was discussed:     1. DONN (obstructive sleep apnea)  - DME CPAP    1. PSG split night study results from 5/31/20 were reviewed with the patient today which showed Very severe obstructive sleep apnea hypopnea with an apnea hypopnea index of 59.3 events per hour.  Severe nocturnal hypoxemia with a lowest arterial oxygen saturation of 73% on room air.  Fragmentation of sleep related to the breathing events and to periodic limb movements.There is an excellent response to CPAP therapy.  Some treatment emergent central apneas are identified at the end of the CPAP titration suggesting an over titration effect but complex sleep apnea cannot be excluded. CPAP was applied at 5 to 14 cm water pressure.  The higher pressures were used to address some persisting hypopnea events. In the final pressure levels a number of central apnea episodes emerged, suggesting an overtreatment effect. Overall there were 40 episodes of central apnea, 8 episodes of obstructive apnea and 30 episodes of hypopnea.  At a pressure of 11 cmH2O the apnea hypopnea index was 0 with a lowest arterial oxygen saturation " of 93%.   Recommendation:  Based on patient's history it suggests DONN and CPAP at 11 cmH2O should be effective. If she does not do well with CPAP sleep apnea dedicated titration polysomnography might be considered with the use of BiPAP or ASV therapy as needed.     2. Patient is amenable to CPAP therapy. DME order for CPAP 11 cmH2O with Damaris View mask medium or MOC was provided today.   3. Continue to stay active.     Follow-up: Return in about 3 months (around 9/4/2020).     GALINDO Garrison.

## 2020-07-20 ENCOUNTER — PATIENT MESSAGE (OUTPATIENT)
Dept: MEDICAL GROUP | Facility: MEDICAL CENTER | Age: 65
End: 2020-07-20

## 2020-07-20 DIAGNOSIS — Z01.84 IMMUNITY STATUS TESTING: ICD-10-CM

## 2020-07-20 NOTE — TELEPHONE ENCOUNTER
----- Message from Trena Sears, Med Ass't sent at 7/20/2020 10:10 AM PDT -----  Regarding: FW: Non-Urgent Medical Question  Contact: [phone]    ----- Message -----  From: Mey Araujo  Sent: 7/20/2020  10:06 AM PDT  To: Pilar Tang  Subject: Non-Urgent Medical Question                      Satya and I would like to get the lab test to see if we already have the COVID antibodies. How can we do that?  Zhang [birth year]  Mey [birth year]

## 2020-08-28 ENCOUNTER — TELEMEDICINE (OUTPATIENT)
Dept: SLEEP MEDICINE | Facility: MEDICAL CENTER | Age: 65
End: 2020-08-28
Payer: COMMERCIAL

## 2020-08-28 VITALS — WEIGHT: 172 LBS | BODY MASS INDEX: 29.37 KG/M2 | HEIGHT: 64 IN

## 2020-08-28 DIAGNOSIS — G47.34 NOCTURNAL HYPOXIA: ICD-10-CM

## 2020-08-28 DIAGNOSIS — G47.33 OSA (OBSTRUCTIVE SLEEP APNEA): ICD-10-CM

## 2020-08-28 PROCEDURE — 99213 OFFICE O/P EST LOW 20 MIN: CPT | Mod: 95,CR | Performed by: NURSE PRACTITIONER

## 2020-08-28 NOTE — PROGRESS NOTES
Virtual Visit: Established Patient   This visit was conducted via Zoom  using secure and encrypted videoconferencing technology. The patient was in a private location in the state of [place].    The patient's identity was confirmed and verbal consent was obtained for this virtual visit.  Given the importance of social distancing and other strategies recommended to reduce the risk of COVID-19 transmission, I am providing medical care to this patient via audio/video visit in place of an in person visit at the request of the patient.  Subjective:   CC:   Chief Complaint   Patient presents with   • Follow-Up     DONN-Last seen 06/04/2020       Mey Araujo is a 65 y.o. female presenting for evaluation and management of DONN. PMH includes headache, snoring, osteopenia, nocturnal hypoxia, DONN, non-smoker.    PSG split night study results from 5/31/20 were reviewed with the patient today which showed Very severe obstructive sleep apnea hypopnea with an apnea hypopnea index of 59.3 events per hour.  Severe nocturnal hypoxemia with a lowest arterial oxygen saturation of 73% on room air.  Fragmentation of sleep related to the breathing events and to periodic limb movements.  There is an excellent response to CPAP therapy.  Some treatment emergent central apneas are identified at the end of the CPAP titration suggesting an over titration effect but complex sleep apnea cannot be excluded. CPAP was applied at 5 to 14 cm water pressure.  The higher pressures were used to address some persisting hypopnea events.  In the final pressure levels a number of central apnea episodes emerged, suggesting an overtreatment effect.  Overall there were 40 episodes of central apnea, 8 episodes of obstructive apnea and 30 episodes of hypopnea.  At a pressure of 11 cmH2O the apnea hypopnea index was 0 with a lowest arterial oxygen saturation of 93%.     Compliance report from 7/29/20-8/27/20 was downloaded and reviewed with the patient which  "showed CPAP 11 cmH2O, 93% compliance, 6 hrs 40 min use, AHI of 4.9. She is tolerating the pressure and mask well but did change the mask to a small which fit better.  Overall she is still trying to get used to the CPAP and the mask and sometimes the mask does affect her sleep because she likes to sleep on her side and her face.  This moves the mask which causes a leak and it wakes her up.  She goes to bed at 11 PM and wakes up between 6:30 and 7 AM.  She is able to sleep through the night without any problems.  She does take a nap almost every day between 12 and 2:30 for 1 hour.  She never feels tired and has plenty of energy throughout the day to complete her activities.  She denies morning headache and per her  her snoring and gasps for air has resolved since she has been on CPAP therapy.  She does suffer from seasonal allergies and gets very itchy eyes, nose and throat.  She takes over-the-counter Claritin which \"takes the edge off\".  She stays active by walking 45 minutes 2-3 times a week, gardening, golfing and kayaking when able. She denies any new health problems or medications.    ROS   Denies any recent fevers or chills. No nausea or vomiting. No chest pains or shortness of breath.     Allergies   Allergen Reactions   • Pcn [Penicillins] Rash     Rash     • Hydrocodone      Itchy nose         Current medicines (including changes today)  Current Outpatient Medications   Medication Sig Dispense Refill   • Cholecalciferol (D3 ADULT PO) Take  by mouth.     • valACYclovir (VALTREX) 500 MG Tab Take 1 Tab by mouth 2 times a day. For 3 days at the start of outbreak. 18 Tab 1   • albuterol (VENTOLIN OR PROVENTIL) 108 (90 BASE) MCG/ACT Aero Soln inhalation aerosol Inhale 2 Puffs by mouth every 6 hours as needed for Shortness of Breath. (Patient not taking: Reported on 6/4/2020) 8.5 g 3     No current facility-administered medications for this visit.        Patient Active Problem List    Diagnosis Date Noted " "  • DONN (obstructive sleep apnea) 05/12/2020   • Non-smoker 05/12/2020   • Nocturnal hypoxia 12/06/2019   • Vitamin D insufficiency 12/06/2019   • Snoring 11/08/2019   • Finger pain, left 11/08/2019   • Swelling of finger joint of left hand 11/08/2019   • Osteopenia 11/08/2019   • Preventative health care 04/06/2016   • History of kidney stones 04/06/2016   • Headache 03/10/2016   • History of pneumonia 03/10/2016       Family History   Problem Relation Age of Onset   • Cancer Father         prostate ca, skin cancer.    • Lung Disease Father         pulmonary fibrosis   • Other Sister         sleep apnea   • Psychiatric Illness Sister         depression       She  has a past medical history of Allergic rhinitis, Kidney stone, and Osteopenia (11/8/2019).  She  has a past surgical history that includes nephrolithotomy; tonsillectomy and adenoidectomy; tonsillectomy; and sinuscope.       Objective:   Ht 1.626 m (5' 4\")   Wt 78 kg (172 lb)   BMI 29.52 kg/m²     Physical Exam:  Constitutional: Alert, no distress, well-groomed.  Skin: No rashes in visible areas.  Eye: Round. Conjunctiva clear, lids normal. No icterus.   ENMT: Lips pink without lesions, good dentition, moist mucous membranes. Phonation normal.  Neck: Moves freely without pain.  Respiratory: Unlabored respiratory effort, no cough or audible wheeze  Psych: Alert and oriented x3, normal affect and mood.       Assessment and Plan:   The following treatment plan was discussed:     1. DONN (obstructive sleep apnea)  - DME Mask and Supplies    2. Nocturnal hypoxia    3. BMI 29.0-29.9,adult      Discussed the cardiovascular and neuropsychiatric risks of untreated DONN; including but not limited to: HTN, DM, MI, ASCVD, CVA, CHF, traffic accidents.     1. Compliance report from 7/29/20-8/27/20 was downloaded and reviewed with the patient which showed CPAP 11 cmH2O, 93% compliance, 6 hrs 40 min use, AHI of 4.9. Continue CPAP. Patient is compliant and benefiting from " CPAP therapy for management of DONN.   2. DME order ([facility]) for mask (Damaris view FFM small or MOC) and supplies was provided today. Continue to clean mask and supplies weekly, and change supplies per insurance guidelines.   3. Order OPO on CPAP 11 cmH2O at next office visit due to severe hypoxemia with a lowest arterial oxygen saturation of 73% on room air during PSG study.   4. Continue to stay active.   5. Follow up with the appropriate healthcare practitioners for all other medical problems and issues.  6. Sleep hygiene discussed.    Follow-up: Return in about 6 months (around 2/28/2021). or sooner if needed.     GALINDO Garrison.    This dictation was created using voice recognition software. The accuracy of the dictation is limited to the abilities of the software. I expect there may be some errors of grammar and possibly content.

## 2020-12-03 ENCOUNTER — TELEPHONE (OUTPATIENT)
Dept: MEDICAL GROUP | Facility: MEDICAL CENTER | Age: 65
End: 2020-12-03

## 2020-12-03 DIAGNOSIS — E55.9 VITAMIN D INSUFFICIENCY: ICD-10-CM

## 2020-12-03 DIAGNOSIS — Z00.00 ANNUAL PHYSICAL EXAM: ICD-10-CM

## 2020-12-03 NOTE — TELEPHONE ENCOUNTER
----- Message from Tamanna Brenner, Med Ass't sent at 11/30/2020  9:01 AM PST -----  Regarding: FW: Non-Urgent Medical Question  Contact: [phone]    ----- Message -----  From: Mey Araujo  Sent: 11/27/2020   9:46 AM PST  To: Pilar Navarro [facility]  Subject: Non-Urgent Medical Question                      Good morning! Two things. Will you please order a standard CBC lab for me to do after the first of the year, or anything else lab wise you feel is important.   Secondly my two sisters, CHRISTIE Cota and Marya HUMPHREYS'Gopal PCP, Chloe Granado retired after having a baby. They loved her. Can you accept them as patients?  They are healthy adults.  Thank you so much for taking care of Satya. He has never had a PCP who he admires and respects as you!  Thanks again for everything!  Mey Araujo

## 2021-01-20 ENCOUNTER — TELEPHONE (OUTPATIENT)
Dept: MEDICAL GROUP | Facility: MEDICAL CENTER | Age: 66
End: 2021-01-20

## 2021-01-20 DIAGNOSIS — M25.532 LEFT WRIST PAIN: ICD-10-CM

## 2021-01-20 NOTE — TELEPHONE ENCOUNTER
Regarding: Procedure Question  ----- Message from Trena Sears, Med Ass't sent at 1/20/2021  7:17 AM PST -----       ----- Message from Mey Hyde to FRANCY Gunn sent at 1/19/2021  8:51 AM -----   Urgent:  I may have fractured my left wrist. I would like to go to [facility] but my [facility] requires a pre authorization. Can you start this process for me please?  Mey Hyde  [phone]

## 2021-02-01 ENCOUNTER — HOSPITAL ENCOUNTER (OUTPATIENT)
Dept: RADIOLOGY | Facility: MEDICAL CENTER | Age: 66
End: 2021-02-01
Attending: NURSE PRACTITIONER
Payer: COMMERCIAL

## 2021-02-01 DIAGNOSIS — Z12.31 ENCOUNTER FOR MAMMOGRAM TO ESTABLISH BASELINE MAMMOGRAM: ICD-10-CM

## 2021-02-01 PROCEDURE — 77063 BREAST TOMOSYNTHESIS BI: CPT

## 2021-02-03 ENCOUNTER — HOSPITAL ENCOUNTER (OUTPATIENT)
Dept: LAB | Facility: MEDICAL CENTER | Age: 66
End: 2021-02-03
Attending: NURSE PRACTITIONER
Payer: COMMERCIAL

## 2021-02-03 DIAGNOSIS — E55.9 VITAMIN D INSUFFICIENCY: ICD-10-CM

## 2021-02-03 DIAGNOSIS — Z00.00 ANNUAL PHYSICAL EXAM: ICD-10-CM

## 2021-02-03 LAB
25(OH)D3 SERPL-MCNC: 65 NG/ML (ref 30–100)
BASOPHILS # BLD AUTO: 1.1 % (ref 0–1.8)
BASOPHILS # BLD: 0.05 K/UL (ref 0–0.12)
EOSINOPHIL # BLD AUTO: 0.19 K/UL (ref 0–0.51)
EOSINOPHIL NFR BLD: 4 % (ref 0–6.9)
ERYTHROCYTE [DISTWIDTH] IN BLOOD BY AUTOMATED COUNT: 45.2 FL (ref 35.9–50)
HCT VFR BLD AUTO: 43.7 % (ref 37–47)
HGB BLD-MCNC: 13.7 G/DL (ref 12–16)
IMM GRANULOCYTES # BLD AUTO: 0.04 K/UL (ref 0–0.11)
IMM GRANULOCYTES NFR BLD AUTO: 0.8 % (ref 0–0.9)
LYMPHOCYTES # BLD AUTO: 1.95 K/UL (ref 1–4.8)
LYMPHOCYTES NFR BLD: 41 % (ref 22–41)
MCH RBC QN AUTO: 27.7 PG (ref 27–33)
MCHC RBC AUTO-ENTMCNC: 31.4 G/DL (ref 33.6–35)
MCV RBC AUTO: 88.3 FL (ref 81.4–97.8)
MONOCYTES # BLD AUTO: 0.39 K/UL (ref 0–0.85)
MONOCYTES NFR BLD AUTO: 8.2 % (ref 0–13.4)
NEUTROPHILS # BLD AUTO: 2.14 K/UL (ref 2–7.15)
NEUTROPHILS NFR BLD: 44.9 % (ref 44–72)
NRBC # BLD AUTO: 0 K/UL
NRBC BLD-RTO: 0 /100 WBC
PLATELET # BLD AUTO: 299 K/UL (ref 164–446)
PMV BLD AUTO: 11.5 FL (ref 9–12.9)
RBC # BLD AUTO: 4.95 M/UL (ref 4.2–5.4)
TSH SERPL DL<=0.005 MIU/L-ACNC: 2.2 UIU/ML (ref 0.38–5.33)
WBC # BLD AUTO: 4.8 K/UL (ref 4.8–10.8)

## 2021-02-03 PROCEDURE — 80061 LIPID PANEL: CPT

## 2021-02-03 PROCEDURE — 80053 COMPREHEN METABOLIC PANEL: CPT

## 2021-02-03 PROCEDURE — 85025 COMPLETE CBC W/AUTO DIFF WBC: CPT

## 2021-02-03 PROCEDURE — 36415 COLL VENOUS BLD VENIPUNCTURE: CPT

## 2021-02-03 PROCEDURE — 84443 ASSAY THYROID STIM HORMONE: CPT

## 2021-02-03 PROCEDURE — 82306 VITAMIN D 25 HYDROXY: CPT

## 2021-02-04 LAB
ALBUMIN SERPL BCP-MCNC: 4.3 G/DL (ref 3.2–4.9)
ALBUMIN/GLOB SERPL: 1.5 G/DL
ALP SERPL-CCNC: 83 U/L (ref 30–99)
ALT SERPL-CCNC: 22 U/L (ref 2–50)
ANION GAP SERPL CALC-SCNC: 13 MMOL/L (ref 7–16)
AST SERPL-CCNC: 27 U/L (ref 12–45)
BILIRUB SERPL-MCNC: 0.9 MG/DL (ref 0.1–1.5)
BUN SERPL-MCNC: 17 MG/DL (ref 8–22)
CALCIUM SERPL-MCNC: 9.4 MG/DL (ref 8.5–10.5)
CHLORIDE SERPL-SCNC: 100 MMOL/L (ref 96–112)
CHOLEST SERPL-MCNC: 197 MG/DL (ref 100–199)
CO2 SERPL-SCNC: 21 MMOL/L (ref 20–33)
CREAT SERPL-MCNC: 0.68 MG/DL (ref 0.5–1.4)
FASTING STATUS PATIENT QL REPORTED: NORMAL
GLOBULIN SER CALC-MCNC: 2.8 G/DL (ref 1.9–3.5)
GLUCOSE SERPL-MCNC: 82 MG/DL (ref 65–99)
HDLC SERPL-MCNC: 73 MG/DL
LDLC SERPL CALC-MCNC: 111 MG/DL
POTASSIUM SERPL-SCNC: 4.1 MMOL/L (ref 3.6–5.5)
PROT SERPL-MCNC: 7.1 G/DL (ref 6–8.2)
SODIUM SERPL-SCNC: 134 MMOL/L (ref 135–145)
TRIGL SERPL-MCNC: 67 MG/DL (ref 0–149)

## 2021-02-10 ENCOUNTER — TELEPHONE (OUTPATIENT)
Dept: MEDICAL GROUP | Facility: MEDICAL CENTER | Age: 66
End: 2021-02-10

## 2021-02-13 PROCEDURE — 0001A PFIZER SARS-COV-2 VACCINE: CPT

## 2021-02-13 PROCEDURE — 91300 PFIZER SARS-COV-2 VACCINE: CPT

## 2021-02-15 ENCOUNTER — IMMUNIZATION (OUTPATIENT)
Dept: FAMILY PLANNING/WOMEN'S HEALTH CLINIC | Facility: IMMUNIZATION CENTER | Age: 66
End: 2021-02-15
Payer: COMMERCIAL

## 2021-02-15 DIAGNOSIS — Z23 ENCOUNTER FOR VACCINATION: Primary | ICD-10-CM

## 2021-02-16 ENCOUNTER — TELEPHONE (OUTPATIENT)
Dept: MEDICAL GROUP | Facility: MEDICAL CENTER | Age: 66
End: 2021-02-16

## 2021-02-16 NOTE — TELEPHONE ENCOUNTER
ESTABLISHED PATIENT PRE-VISIT PLANNING     Patient was NOT contacted to complete PVP.     Note: Patient will not be contacted if there is no indication to call.     1.  Reviewed notes from the last few office visits within the medical group: Yes    2.  If any orders were placed at last visit or intended to be done for this visit (i.e. 6 mos follow-up), do we have Results/Consult Notes?         •  Labs - Labs ordered, completed on 02/03/2021 and results are in chart.  Note: If patient appointment is for lab review and patient did not complete labs, check with provider if OK to reschedule patient until labs completed.       •  Imaging - Imaging ordered, completed and results are in chart.       •  Referrals - Referral ordered, patient has NOT been seen.    3. Is this appointment scheduled as a Hospital Follow-Up? No    4.  Immunizations were updated in Epic using Reconcile Outside Information activity? Yes    5.  Patient is due for the following Health Maintenance Topics:   Health Maintenance Due   Topic Date Due   • IMM ZOSTER VACCINES (1 of 2) 03/14/2007   • IMM INFLUENZA (1) 09/01/2020       6.  AHA (Pulse8) form printed for Provider? N/A         Outside information NOT reconciled using the Learndot feature. Per Usha Collins, the Learndot feature is down as of 02/09/2021 at 2:00pm. Will reconcile outside information at a later date.

## 2021-02-22 ENCOUNTER — TELEMEDICINE (OUTPATIENT)
Dept: MEDICAL GROUP | Facility: MEDICAL CENTER | Age: 66
End: 2021-02-22
Payer: COMMERCIAL

## 2021-02-22 VITALS
HEIGHT: 64 IN | SYSTOLIC BLOOD PRESSURE: 107 MMHG | WEIGHT: 178 LBS | DIASTOLIC BLOOD PRESSURE: 76 MMHG | BODY MASS INDEX: 30.39 KG/M2

## 2021-02-22 DIAGNOSIS — M72.2 PLANTAR FASCIITIS: ICD-10-CM

## 2021-02-22 DIAGNOSIS — G47.33 OSA (OBSTRUCTIVE SLEEP APNEA): ICD-10-CM

## 2021-02-22 DIAGNOSIS — Z00.00 ANNUAL PHYSICAL EXAM: ICD-10-CM

## 2021-02-22 DIAGNOSIS — E78.00 PURE HYPERCHOLESTEROLEMIA: ICD-10-CM

## 2021-02-22 DIAGNOSIS — E66.9 OBESITY (BMI 30-39.9): ICD-10-CM

## 2021-02-22 PROCEDURE — 99396 PREV VISIT EST AGE 40-64: CPT | Mod: 95,CR | Performed by: NURSE PRACTITIONER

## 2021-02-22 ASSESSMENT — FIBROSIS 4 INDEX: FIB4 SCORE: 1.21

## 2021-02-22 ASSESSMENT — PATIENT HEALTH QUESTIONNAIRE - PHQ9: CLINICAL INTERPRETATION OF PHQ2 SCORE: 0

## 2021-02-22 NOTE — ASSESSMENT & PLAN NOTE
Diagnosed Dec 2019 after abnormal overnight home apnea/oximetry test showed oxygen desaturation in the 60s-70s.     She established with pulmonology May 2020, now using CPAP night, good compliance, feeling better.

## 2021-02-22 NOTE — ASSESSMENT & PLAN NOTE
Recent . Does eat a lot of comfort food, twice weekly. Rare fast food/junk food.     No significant family history of heart disease or hyperlipidemia.

## 2021-02-22 NOTE — ASSESSMENT & PLAN NOTE
Patient reports significant foot/heel pain after beginning exercise via walking.  Since then she has been having significant pain, especially in the morning.  She has been taking ibuprofen 400 mg 3 times daily which does help.    She has been able to do her walking/exercise since this began.  No history of plantar fasciitis or foot issues.  No trauma or injury

## 2021-02-22 NOTE — PROGRESS NOTES
Telemedicine Visit: Established Patient     This encounter was conducted via Zoom.   Verbal consent was obtained. Patient's identity was verified.    Subjective:   CC: Annual, lab review  Mey Araujo is a 65 y.o. female presenting for evaluation and management of:    Pure hypercholesterolemia  Recent . Does eat a lot of comfort food, twice weekly. Rare fast food/junk food.     No significant family history of heart disease or hyperlipidemia.     Plantar fasciitis  Patient reports significant foot/heel pain after beginning exercise via walking.  Since then she has been having significant pain, especially in the morning.  She has been taking ibuprofen 400 mg 3 times daily which does help.    She has been able to do her walking/exercise since this began.  No history of plantar fasciitis or foot issues.  No trauma or injury    DONN (obstructive sleep apnea)  Diagnosed Dec 2019 after abnormal overnight home apnea/oximetry test showed oxygen desaturation in the 60s-70s.     She established with pulmonology May 2020, now using CPAP night, good compliance, feeling better.        ROS   Denies any recent fevers or chills. No nausea or vomiting. No chest pains or shortness of breath.     Allergies   Allergen Reactions   • Pcn [Penicillins] Rash     Rash     • Hydrocodone      Itchy nose         Current medicines (including changes today)  Current Outpatient Medications   Medication Sig Dispense Refill   • Cholecalciferol (D3 ADULT PO) Take  by mouth.     • valACYclovir (VALTREX) 500 MG Tab Take 1 Tab by mouth 2 times a day. For 3 days at the start of outbreak. (Patient not taking: Reported on 2/22/2021) 18 Tab 1     No current facility-administered medications for this visit.       Patient Active Problem List    Diagnosis Date Noted   • Pure hypercholesterolemia 02/22/2021   • Plantar fasciitis 02/22/2021   • DONN (obstructive sleep apnea) 05/12/2020   • Non-smoker 05/12/2020   • Nocturnal hypoxia 12/06/2019   •  "Vitamin D insufficiency 12/06/2019   • Snoring 11/08/2019   • Finger pain, left 11/08/2019   • Swelling of finger joint of left hand 11/08/2019   • Osteopenia 11/08/2019   • Preventative health care 04/06/2016   • History of kidney stones 04/06/2016   • Headache 03/10/2016   • History of pneumonia 03/10/2016       Family History   Problem Relation Age of Onset   • Cancer Father         prostate ca, skin cancer.    • Lung Disease Father         pulmonary fibrosis   • Other Sister         sleep apnea   • Psychiatric Illness Sister         depression       She  has a past medical history of Allergic rhinitis, Kidney stone, and Osteopenia (11/8/2019).  She  has a past surgical history that includes nephrolithotomy; tonsillectomy and adenoidectomy; tonsillectomy; and sinuscope.       Objective:   /76 (BP Location: Left arm)   Ht 1.626 m (5' 4\")   Wt 80.7 kg (178 lb)   BMI 30.55 kg/m²     Physical Exam:  Constitutional: Alert, no distress, well-groomed.  Skin: No rashes in visible areas.  Eye: Round. Conjunctiva clear, lids normal. No icterus.   ENMT: Lips pink without lesions, good dentition, moist mucous membranes. Phonation normal.  Neck: No masses, no thyromegaly. Moves freely without pain.  CV: Pulse as reported by patient  Respiratory: Unlabored respiratory effort, no cough or audible wheeze  Psych: Alert and oriented x3, normal affect and mood.       Assessment and Plan:   The following treatment plan was discussed:     1. Annual physical exam  Patient and I discussed the importance of lifestyle changes, with particular emphasis on decreasing sugar and carbohydrate intake and increasing plant-based nutrition (for the purposes of weight loss, general health, and prevention of chronic illnesses), as well as regular cardiovascular exercise, proper sleep, and stress management. Patient verbalized understanding.    2. Pure hypercholesterolemia  Unstable  Diet and exercise    3. Plantar " fasciitis  Unstable  Increase ibuprofen to 600 mg every 6 hours or 800 mg every 8 hours for the next week  Exercises and info sent via [employer]    4. DONN (obstructive sleep apnea)  Stable  Continue CPAP nightly  Continue follow up with sleep medicine      Follow-up: Return in about 1 year (around 2/22/2022).

## 2021-03-06 ENCOUNTER — NURSE TRIAGE (OUTPATIENT)
Dept: HEALTH INFORMATION MANAGEMENT | Facility: OTHER | Age: 66
End: 2021-03-06

## 2021-03-08 ENCOUNTER — PATIENT MESSAGE (OUTPATIENT)
Dept: MEDICAL GROUP | Facility: MEDICAL CENTER | Age: 66
End: 2021-03-08

## 2021-03-08 DIAGNOSIS — M72.2 PLANTAR FASCIITIS: ICD-10-CM

## 2021-03-08 DIAGNOSIS — M79.672 LEFT FOOT PAIN: ICD-10-CM

## 2021-03-08 DIAGNOSIS — M25.572 ACUTE LEFT ANKLE PAIN: ICD-10-CM

## 2021-03-15 ENCOUNTER — NURSE TRIAGE (OUTPATIENT)
Dept: HEALTH INFORMATION MANAGEMENT | Facility: OTHER | Age: 66
End: 2021-03-15

## 2021-03-17 ENCOUNTER — IMMUNIZATION (OUTPATIENT)
Dept: FAMILY PLANNING/WOMEN'S HEALTH CLINIC | Facility: IMMUNIZATION CENTER | Age: 66
End: 2021-03-17
Attending: INTERNAL MEDICINE
Payer: COMMERCIAL

## 2021-03-17 DIAGNOSIS — Z23 ENCOUNTER FOR VACCINATION: Primary | ICD-10-CM

## 2021-03-17 PROCEDURE — 91300 PFIZER SARS-COV-2 VACCINE: CPT | Performed by: INTERNAL MEDICINE

## 2021-03-17 PROCEDURE — 0002A PFIZER SARS-COV-2 VACCINE: CPT | Performed by: INTERNAL MEDICINE

## 2021-03-31 ENCOUNTER — TELEMEDICINE (OUTPATIENT)
Dept: SLEEP MEDICINE | Facility: MEDICAL CENTER | Age: 66
End: 2021-03-31
Payer: COMMERCIAL

## 2021-03-31 VITALS
WEIGHT: 168 LBS | DIASTOLIC BLOOD PRESSURE: 86 MMHG | HEART RATE: 68 BPM | BODY MASS INDEX: 28.68 KG/M2 | HEIGHT: 64 IN | SYSTOLIC BLOOD PRESSURE: 113 MMHG

## 2021-03-31 DIAGNOSIS — Z78.9 NONSMOKER: ICD-10-CM

## 2021-03-31 DIAGNOSIS — G47.33 OSA (OBSTRUCTIVE SLEEP APNEA): ICD-10-CM

## 2021-03-31 PROCEDURE — 99213 OFFICE O/P EST LOW 20 MIN: CPT | Mod: 95,CR | Performed by: NURSE PRACTITIONER

## 2021-03-31 RX ORDER — FLUTICASONE PROPIONATE 50 MCG
1 SPRAY, SUSPENSION (ML) NASAL DAILY
COMMUNITY
End: 2022-04-07

## 2021-03-31 ASSESSMENT — FIBROSIS 4 INDEX: FIB4 SCORE: 1.23

## 2021-03-31 NOTE — PROGRESS NOTES
Virtual Visit: Established Patient   This visit was conducted via Zoom using secure and encrypted videoconferencing technology. The patient was in a private location in the state of [place].    The patient's identity was confirmed and verbal consent was obtained for this virtual visit.    Subjective:   CC:   Chief Complaint   Patient presents with   • Follow-Up     DONN. Last seen 08/28/20       Mey Araujo is a 66 y.o. female presenting for evaluation and management of:    DONN; compliance check  Last OV 8/28/20 with Milton KELLER    PMH includes headache, snoring, osteopenia, nocturnal hypoxia, DONN, non-smoker.    Currently using CPAP 11 cm.  Compliance report 3/1/2021 through 3/30/2021 notes 93% compliance, average nightly use 6 hours 54 minutes, minimal mask leak with reduced AHI of 2.4/hr. Reviewed with patient. She tolerates mask and pressure well. She notes her  to sleep better since she started therapy but she does not overall feel her sleep is improved. She does not nap regularly due to sleepiness; no AM headaches.  She notes some seasonal allergies and using flonase now.   She denies cardiac or respiratory symptoms.  She denies GERD.    SLEEP HX:  PSG split night study results from 5/31/20 were reviewed with the patient today which showed Very severe obstructive sleep apnea hypopnea with an apnea hypopnea index of 59.3 events per hour.  Severe nocturnal hypoxemia with a lowest arterial oxygen saturation of 73% on room air.      ROS in HPI; otherwise negative.    Allergies   Allergen Reactions   • Pcn [Penicillins] Rash     Rash     • Hydrocodone      Itchy nose         Current medicines (including changes today)  Current Outpatient Medications   Medication Sig Dispense Refill   • fluticasone (FLONASE) 50 MCG/ACT nasal spray Administer 1 Spray into affected nostril(S) every day.     • Cholecalciferol (D3 ADULT PO) Take  by mouth.       No current facility-administered medications for this visit.  "      Patient Active Problem List    Diagnosis Date Noted   • Pure hypercholesterolemia 02/22/2021   • Plantar fasciitis 02/22/2021   • Obesity (BMI 30-39.9) 02/22/2021   • DONN (obstructive sleep apnea) 05/12/2020   • Non-smoker 05/12/2020   • Nocturnal hypoxia 12/06/2019   • Vitamin D insufficiency 12/06/2019   • Snoring 11/08/2019   • Finger pain, left 11/08/2019   • Swelling of finger joint of left hand 11/08/2019   • Osteopenia 11/08/2019   • Preventative health care 04/06/2016   • History of kidney stones 04/06/2016   • Headache 03/10/2016   • History of pneumonia 03/10/2016       Family History   Problem Relation Age of Onset   • Cancer Father         prostate ca, skin cancer.    • Lung Disease Father         pulmonary fibrosis   • Other Sister         sleep apnea   • Psychiatric Illness Sister         depression       She  has a past medical history of Allergic rhinitis, Kidney stone, and Osteopenia (11/8/2019).  She  has a past surgical history that includes nephrolithotomy; tonsillectomy and adenoidectomy; tonsillectomy; and sinuscope.       Objective:   /86 (BP Location: Right arm, Patient Position: Sitting, BP Cuff Size: Adult) Comment: PER PT  Pulse 68   Ht 1.626 m (5' 4\") Comment: per pt  Wt 76.2 kg (168 lb) Comment: per pt  BMI 28.84 kg/m²     Physical Exam:  Constitutional: Alert, no distress, well-groomed.  Skin: No rashes in visible areas.  Eye: Round. Conjunctiva clear, lids normal. No icterus. Glasses.  ENMT: Lips pink without lesions, good dentition, moist mucous membranes. Phonation normal.  Neck: No masses, no thyromegaly. Moves freely without pain.  Respiratory: Unlabored respiratory effort, no cough or audible wheeze  Psych: Alert and oriented x3, normal affect and mood.       Assessment and Plan:   The following treatment plan was discussed:     1. DONN (obstructive sleep apnea)    2. BMI 28.0-28.9,adult    3. Nonsmoker    Other orders  - fluticasone (FLONASE) 50 MCG/ACT nasal " spray; Administer 1 Spray into affected nostril(S) every day.    Continue CPAP nightly  DME mask/supplies  Discussed sleep hygiene  F/u with PCP for other health concerns    Follow-up: 1 year with compliance report, sooner if needed.

## 2021-04-02 ENCOUNTER — TELEPHONE (OUTPATIENT)
Dept: MEDICAL GROUP | Facility: MEDICAL CENTER | Age: 66
End: 2021-04-02

## 2021-04-02 DIAGNOSIS — R06.83 SNORING: ICD-10-CM

## 2021-04-02 DIAGNOSIS — G47.33 OSA (OBSTRUCTIVE SLEEP APNEA): ICD-10-CM

## 2021-04-05 ENCOUNTER — TELEPHONE (OUTPATIENT)
Dept: SLEEP MEDICINE | Facility: MEDICAL CENTER | Age: 66
End: 2021-04-05

## 2021-04-05 DIAGNOSIS — G47.33 OSA (OBSTRUCTIVE SLEEP APNEA): ICD-10-CM

## 2021-04-05 NOTE — TELEPHONE ENCOUNTER
[facility] does not carry Travel CPAPs - most local DME companies do not carry them.  The only ones that do are [facility] and [facility].  Otherwise they must look online.  They are almost never covered by insurance - only time this does happen is when pt's submit the claim themselves and get a reimbursement.    The orders also must have settings and generally all supplies listed.  -DME other as TRAVEL CPAP is not sufficient.     Please reach out to pt and advise that [facility] does not carry them, verify if she wants to purchase OOP and have corrected order placed and sent to pt.

## 2021-04-06 NOTE — TELEPHONE ENCOUNTER
That is correct  DME other is not sufficient for travel CPAP, they definitely need the settings on the order - plus many time parts are not compatible and they need to purchase specific tubing and filters to go with the travel machine so providing a full order is the best practice so follow up orders are not needed.

## 2021-04-06 NOTE — TELEPHONE ENCOUNTER
Please re do order as per message below DME other is not sufficient, order needs settings and all supplies listed; also waiting for pt to let me know if she would like me to send the order some where else.

## 2021-04-27 ENCOUNTER — TELEPHONE (OUTPATIENT)
Dept: SLEEP MEDICINE | Facility: MEDICAL CENTER | Age: 66
End: 2021-04-27

## 2021-04-27 NOTE — TELEPHONE ENCOUNTER
Pt called and LM. She plans to order travel CPAP from [email or web address] and currently has an order from our office. She would like advice on what type of travel CPAP will meet her needs best. She can be reached at [phone]. She is looking for a recommendation.

## 2021-04-30 NOTE — TELEPHONE ENCOUNTER
I called and spoke with patient. I advised her and she requested that the order be faxed over to [facility] as well as [email or web address]. I faxed the order to both companies for her. Pt states understanding and has no further questions at this time.

## 2021-10-28 ENCOUNTER — HOSPITAL ENCOUNTER (OUTPATIENT)
Dept: LAB | Facility: MEDICAL CENTER | Age: 66
End: 2021-10-28
Attending: NURSE PRACTITIONER
Payer: COMMERCIAL

## 2021-10-28 ENCOUNTER — OFFICE VISIT (OUTPATIENT)
Dept: MEDICAL GROUP | Facility: MEDICAL CENTER | Age: 66
End: 2021-10-28
Payer: COMMERCIAL

## 2021-10-28 VITALS
HEIGHT: 64 IN | DIASTOLIC BLOOD PRESSURE: 78 MMHG | SYSTOLIC BLOOD PRESSURE: 106 MMHG | HEART RATE: 59 BPM | OXYGEN SATURATION: 96 % | WEIGHT: 170 LBS | BODY MASS INDEX: 29.02 KG/M2 | TEMPERATURE: 98.3 F

## 2021-10-28 DIAGNOSIS — R42 DIZZINESS: ICD-10-CM

## 2021-10-28 DIAGNOSIS — Z83.49 FAMILY HISTORY OF THYROID DISEASE IN SISTER: ICD-10-CM

## 2021-10-28 LAB
ALBUMIN SERPL BCP-MCNC: 4.6 G/DL (ref 3.2–4.9)
ALBUMIN/GLOB SERPL: 1.8 G/DL
ALP SERPL-CCNC: 76 U/L (ref 30–99)
ALT SERPL-CCNC: 19 U/L (ref 2–50)
ANION GAP SERPL CALC-SCNC: 12 MMOL/L (ref 7–16)
AST SERPL-CCNC: 24 U/L (ref 12–45)
BILIRUB SERPL-MCNC: 0.8 MG/DL (ref 0.1–1.5)
BUN SERPL-MCNC: 15 MG/DL (ref 8–22)
CALCIUM SERPL-MCNC: 10.3 MG/DL (ref 8.4–10.2)
CHLORIDE SERPL-SCNC: 99 MMOL/L (ref 96–112)
CO2 SERPL-SCNC: 28 MMOL/L (ref 20–33)
CREAT SERPL-MCNC: 0.86 MG/DL (ref 0.5–1.4)
GLOBULIN SER CALC-MCNC: 2.6 G/DL (ref 1.9–3.5)
GLUCOSE SERPL-MCNC: 95 MG/DL (ref 65–99)
POTASSIUM SERPL-SCNC: 4.2 MMOL/L (ref 3.6–5.5)
PROT SERPL-MCNC: 7.2 G/DL (ref 6–8.2)
SODIUM SERPL-SCNC: 139 MMOL/L (ref 135–145)
T4 FREE SERPL-MCNC: 0.96 NG/DL (ref 0.93–1.7)
THYROPEROXIDASE AB SERPL-ACNC: 17 IU/ML (ref 0–9)
TSH SERPL DL<=0.005 MIU/L-ACNC: 3.28 UIU/ML (ref 0.38–5.33)

## 2021-10-28 PROCEDURE — 86800 THYROGLOBULIN ANTIBODY: CPT

## 2021-10-28 PROCEDURE — 36415 COLL VENOUS BLD VENIPUNCTURE: CPT

## 2021-10-28 PROCEDURE — 86376 MICROSOMAL ANTIBODY EACH: CPT

## 2021-10-28 PROCEDURE — 99214 OFFICE O/P EST MOD 30 MIN: CPT | Performed by: NURSE PRACTITIONER

## 2021-10-28 PROCEDURE — 84439 ASSAY OF FREE THYROXINE: CPT

## 2021-10-28 PROCEDURE — 84443 ASSAY THYROID STIM HORMONE: CPT

## 2021-10-28 PROCEDURE — 80053 COMPREHEN METABOLIC PANEL: CPT

## 2021-10-28 RX ORDER — M-VIT,TX,IRON,MINS/CALC/FOLIC 27MG-0.4MG
1 TABLET ORAL DAILY
COMMUNITY
End: 2022-04-07

## 2021-10-28 RX ORDER — ASCORBIC ACID 500 MG
500 TABLET ORAL DAILY
COMMUNITY
End: 2022-04-07

## 2021-10-28 ASSESSMENT — FIBROSIS 4 INDEX: FIB4 SCORE: 1.23
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
## 2021-10-28 NOTE — PROGRESS NOTES
"Subjective:   Mey Araujo is a 66 y.o. female here today for dizziness    Dizziness  Ongoing for about a year. Has been tracking BP at home for the past week and it is running /67-99    Not taking BP medication. Even when anxious BP is low.     Symptoms include dizziness, has blacked out in the past. If she is able to sit down quickly it will not lead to passing out.     She remembers having this as a child as well.     She did have an episode while driving 6 weeks ago, she was able to pull over and symptoms passed.     Denies nausea, vomiting, headaches.     Water intake per day: drinking six large glasses of water per day.     Drinking 2 cups coffee in the morning. No other caffeine throughout the day.     Does have two 4 oz glasses of wine nightly.        Current medicines (including changes today)  Current Outpatient Medications   Medication Sig Dispense Refill   • ascorbic acid (VITAMIN C) 500 MG tablet Take 500 mg by mouth every day.     • therapeutic multivitamin-minerals (THERAGRAN-M) Tab Take 1 Tablet by mouth every day.     • SUPER B COMPLEX/C PO Take  by mouth.     • Cholecalciferol (D3 ADULT PO) Take  by mouth.     • fluticasone (FLONASE) 50 MCG/ACT nasal spray Administer 1 Spray into affected nostril(S) every day. (Patient not taking: Reported on 10/28/2021)       No current facility-administered medications for this visit.     She  has a past medical history of Allergic rhinitis, Kidney stone, and Osteopenia (11/8/2019).    ROS   No chest pain, no shortness of breath, no abdominal pain  Positive ROS as per HPI.  All other systems reviewed and are negative.     Objective:     /78 (BP Location: Right arm, Patient Position: Sitting, BP Cuff Size: Adult)   Pulse (!) 59   Temp 36.8 °C (98.3 °F) (Temporal)   Ht 1.626 m (5' 4\")   Wt 77.1 kg (170 lb)   SpO2 96%  Body mass index is 29.18 kg/m².     Physical Exam:  Constitutional: Alert, no distress.  Skin: Warm, dry, good turgor, no rashes " in visible areas.  Eye: Equal, round and reactive, conjunctiva clear, lids normal.  ENMT: Mask in place  Respiratory: Unlabored respiratory effort  Psych: Alert and oriented x3, normal affect and mood.        Assessment and Plan:   The following treatment plan was discussed    1. Dizziness  Unstable  Check labs to rule out metabolic issues, she is concerned for thyroid issues due to trouble losing weight and family history of thyroid disease in sister  Increase water intake, reduce caffeine and alcohol intake  Increase sodium in diet.  - Comp Metabolic Panel; Future  - TSH; Future  - FREE THYROXINE; Future  - THYROID PEROXIDASE  (TPO) AB; Future  - ANTITHYROGLOBULIN AB; Future    2. Family history of thyroid disease in sister  - TSH; Future  - FREE THYROXINE; Future  - THYROID PEROXIDASE  (TPO) AB; Future  - ANTITHYROGLOBULIN AB; Future      Followup: Return if symptoms worsen or fail to improve.    I have placed the below orders and discussed them with an approved delegating provider. The MA is performing the below orders under the direction of Dr. Machado

## 2021-10-30 LAB — THYROGLOB AB SERPL-ACNC: 1.8 IU/ML (ref 0–4)

## 2022-02-04 ENCOUNTER — HOSPITAL ENCOUNTER (OUTPATIENT)
Dept: RADIOLOGY | Facility: MEDICAL CENTER | Age: 67
End: 2022-02-04
Attending: NURSE PRACTITIONER
Payer: COMMERCIAL

## 2022-02-04 DIAGNOSIS — Z12.31 VISIT FOR SCREENING MAMMOGRAM: ICD-10-CM

## 2022-02-04 PROCEDURE — 77063 BREAST TOMOSYNTHESIS BI: CPT

## 2022-03-18 ENCOUNTER — TELEPHONE (OUTPATIENT)
Dept: HEALTH INFORMATION MANAGEMENT | Facility: OTHER | Age: 67
End: 2022-03-18
Payer: MEDICARE

## 2022-04-07 ENCOUNTER — OFFICE VISIT (OUTPATIENT)
Dept: SLEEP MEDICINE | Facility: MEDICAL CENTER | Age: 67
End: 2022-04-07
Payer: MEDICARE

## 2022-04-07 VITALS
HEART RATE: 54 BPM | WEIGHT: 170 LBS | BODY MASS INDEX: 29.02 KG/M2 | SYSTOLIC BLOOD PRESSURE: 116 MMHG | HEIGHT: 64 IN | DIASTOLIC BLOOD PRESSURE: 60 MMHG | OXYGEN SATURATION: 96 %

## 2022-04-07 DIAGNOSIS — Z23 ENCOUNTER FOR IMMUNIZATION: ICD-10-CM

## 2022-04-07 DIAGNOSIS — R00.1 BRADYCARDIA: ICD-10-CM

## 2022-04-07 DIAGNOSIS — G47.33 OSA (OBSTRUCTIVE SLEEP APNEA): ICD-10-CM

## 2022-04-07 PROCEDURE — 99214 OFFICE O/P EST MOD 30 MIN: CPT | Mod: 25 | Performed by: NURSE PRACTITIONER

## 2022-04-07 PROCEDURE — G0009 ADMIN PNEUMOCOCCAL VACCINE: HCPCS | Performed by: NURSE PRACTITIONER

## 2022-04-07 PROCEDURE — 90732 PPSV23 VACC 2 YRS+ SUBQ/IM: CPT | Performed by: NURSE PRACTITIONER

## 2022-04-07 ASSESSMENT — PATIENT HEALTH QUESTIONNAIRE - PHQ9: CLINICAL INTERPRETATION OF PHQ2 SCORE: 0

## 2022-04-07 ASSESSMENT — FIBROSIS 4 INDEX: FIB4 SCORE: 1.2

## 2022-04-07 NOTE — Clinical Note
Bradycardia today. HR varied between 49-54 bpm upon rechecking several times. Regular rhythm, but drea. Patient has a history of syncope and near syncope with recent episode in November 2021 when she was driving. Patient states this is chronic but had an episode several years ago when she fainted in the bathroom and broke her nose. Advised to f/u with PCP for further evaluation, cardiac pauses, EKG/holter monitor etc.

## 2022-04-07 NOTE — PROGRESS NOTES
"Chief Complaint   Patient presents with   • Apnea     DONN-Last seen 03/31/2021       HPI:      Mrs. Araujo is a 66 y/o female patient who is in today for annual DONN f/u. PMH includes pure hypercholesterolemia, obesity, dizziness, DONN, never smoker, T&A, headache, h/o syncope, COVID-19 positive antibodies.    Patient has a ResMed CPAP machine that she obtained around June 2020.  Patient is new to [facility].  Compliance report from 3/6/2022-4/6/22 was downloaded and reviewed with the patient which showed CPAP 11 cmH2O, 100% compliance, 7 hrs 35 min use, AHI of 1.0. She is tolerating the pressure and mask well.  She goes to bed at 11 PM and wakes up between 6:30 and 7 AM.  She is able to sleep through the night without any problems and more soundly since being on CPAP therapy. She denies morning headache or snoring.  She does suffer from seasonal allergies and gets very itchy eyes, nose and throat.  She takes over-the-counter Claritin which \"takes the edge off\".  She stays active by walking 45 minutes 2-3 times a week, gardening, golfing and kayaking when able.  She also goes swimming 2-3 times a week for an hour.  Patient states that she has history of passing out or almost passing out with the most recent episode of almost passing out while she was driving in November 2021.  She states that she did pass out when she was a teenager and several years ago when she went to the restroom in the middle of the night.  She did break her nose when she fell from the toilet.  She states that she has not reviewed any of this with her PCP that she can recall or has ever seen cardiology.  She typically can feel the episode coming on and feels as if things are going to \"black out\".  She does not feel that it is vertigo nor does she feel dizzy.  She denies any chest pain during those episodes.  She also denies chest pain, shortness of breath, dizziness, or lightheadedness today.  She is able to leave today independently.  " She would like to obtain the pneumonia vaccine today.    Sleep Study History:   PSG split night study from 5/31/20 indicated very severe obstructive sleep apnea hypopnea with an apnea hypopnea index of 59.3 events per hour.  Severe nocturnal hypoxemia with a lowest arterial oxygen saturation of 73% on room air. Fragmentation of sleep related to the breathing events and to periodic limb movements.  There is an excellent response to CPAP therapy.  Some treatment emergent central apneas are identified at the end of the CPAP titration suggesting an over titration effect but complex sleep apnea cannot be excluded.    ROS:    Constitutional: Denies fevers, Denies weight changes  Eyes: Denies changes in vision, no eye pain  Ears/Nose/Throat/Mouth: Denies nasal congestion or sore throat   Cardiovascular: Denies chest pain or palpitations   Respiratory: Denies shortness of breath , Denies cough  Gastrointestinal/Hepatic: Denies abdominal pain, nausea, vomiting,   Skin/Breast: Denies rash,   Neurological: Denies headache, confusion,   Psychiatric: denies mood disorder   Sleep: denies snoring       Past Medical History:   Diagnosis Date   • Allergic rhinitis    • Kidney stone    • Osteopenia 11/8/2019       Past Surgical History:   Procedure Laterality Date   • NEPHROLITHOTOMY     • SINUSCOPE     • TONSILLECTOMY     • TONSILLECTOMY AND ADENOIDECTOMY         Family History   Problem Relation Age of Onset   • Cancer Father         prostate ca, skin cancer.    • Lung Disease Father         pulmonary fibrosis   • Other Sister         sleep apnea   • Psychiatric Illness Sister         depression       Social History     Socioeconomic History   • Marital status:      Spouse name: Not on file   • Number of children: Not on file   • Years of education: Not on file   • Highest education level: Not on file   Occupational History   • Not on file   Tobacco Use   • Smoking status: Never Smoker   • Smokeless tobacco: Never Used    Vaping Use   • Vaping Use: Never used   Substance and Sexual Activity   • Alcohol use: Yes     Comment: 2 glasses wine 3 d/wk   • Drug use: No   • Sexual activity: Not on file   Other Topics Concern   • Not on file   Social History Narrative   • Not on file     Social Determinants of Health     Financial Resource Strain: Not on file   Food Insecurity: Not on file   Transportation Needs: Not on file   Physical Activity: Not on file   Stress: Not on file   Social Connections: Not on file   Intimate Partner Violence: Not on file   Housing Stability: Not on file       Allergies as of 04/07/2022 - Reviewed 04/07/2022   Allergen Reaction Noted   • Pcn [penicillins] Rash 03/07/2016   • Hydrocodone  03/10/2016        Vitals:  Vitals:    04/07/22 1350   BP: 116/60   Pulse: (!) 54   SpO2: 96%       Current medications as of today   Current Outpatient Medications   Medication Sig Dispense Refill   • Multiple Vitamins-Minerals (CENTRUM SILVER 50+WOMEN PO)      • Biotin w/ Vitamins C & E (HAIR/SKIN/NAILS PO)      • SUPER B COMPLEX/C PO Take  by mouth.     • Cholecalciferol (D3 ADULT PO) Take  by mouth.     • Zinc Acetate, Oral, (ZINC ACETATE PO) Take  by mouth every day.       No current facility-administered medications for this visit.         Physical Exam:   Appearance: Well developed, well nourished, no acute distress  Eyes: PERRL, EOM intact, sclera white, conjunctiva moist  Ears: no lesions or deformities  Hearing: grossly intact  Nose: no lesions or deformities  Respiratory effort: no intercostal retractions or use of accessory muscles  Lung auscultation: no rales, rhonchi or wheezes bilaterally. Clear to ausculation bilaterally.   Heart auscultation: no murmur rub or gallop, RR, but drea  Extremities: no cyanosis or edema  Abdomen: soft  Gait and Station: normal  Digits and nails: no clubbing, cyanosis, petechiae or nodes.  Cranial nerves: grossly intact  Skin: no visible rashes, lesions or ulcers noted  Orientation:  Oriented to time, person and place  Mood and affect: mood and affect appropriate, normal interaction with examiner  Judgement: Intact    Assessment:  1. DONN (obstructive sleep apnea)  DME Mask and Supplies    Overnight Oximetry   2. Bradycardia     3. BMI 29.0-29.9,adult     4. Encounter for immunization  Pneumovax Vaccine (PPSV23)         Plan  Discussed the cardiovascular and neuropsychiatric risks of untreated DONN; including but not limited to: HTN, DM, MI, ASCVD, CVA, CHF, traffic accidents.     1. Compliance report from 3/6/2022-4/6/22 was downloaded and reviewed with the patient which showed CPAP 11 cmH2O, 100% compliance, 7 hrs 35 min use, AHI of 1.0.  Patient is compliant and benefiting from CPAP therapy for management of DONN. Advised patient to use the CPAP every night for more than four hours for optimal health benefit.    *DME order ([facility]) for mask (small Airfit N30i mask or MOC) and supplies was placed today. Continue to clean mask and supplies weekly with soap and water, and change supplies per insurance guidelines.     *Order OPO on CPAP 11 cmH2O to rule out nocturnal hypoxia.  Will message patient with results when available.    *Sleep hygiene discussed. Recommend keeping a set sleep/wake schedule. Logging enough hours of sleep. Limiting/Avoiding naps. No caffeine after noon and no heavy meals in the evening.     2. Bradycardia. HR varied between 49-54 bpm upon rechecking several times. Regular rhythm, but drea. Patient has a history of syncope and near syncope with recent episode in November 2021 when she was driving.  She states this is chronic and has never been evaluated by cardiology.  Patient is strongly encouraged to follow-up with her PCP for further evaluation of cardiac pauses, EKG/holter monitor etc.  3. Continue to stay active.  If your BMI is 25-29.9 you are overweight. If your BMI is 30 or greater you are obese. To lose weight eat less, move more, or both. Any diet that reduces caloric  intake can help with weight loss. Extra weight may reduce your lifespan. Avoid dramatic unsustainable dietary changes that result in the yo-yo effect (down then back up.)  Usually small modifications in diet and exercise are easier to stick with.  4. PPSV23 vaccination was administered today.   5. Follow up with appropriate healthcare providers for all other medical problems.  6. F/u in 6 months for DONN, sooner if needed.       GALINDO Garrison.      This dictation was created using voice recognition software. The accuracy of the dictation is limited to the abilities of the software. I expect there may be some errors of grammar and possibly content.

## 2022-04-08 NOTE — PROGRESS NOTES
Please call patient and schedule her for evaluation ASAP, with me or other provider. In person.     GALINDO Gunn.

## 2022-04-11 PROBLEM — R55 SYNCOPE: Status: ACTIVE | Noted: 2022-04-11

## 2022-04-11 PROBLEM — E66.3 OVERWEIGHT WITH BODY MASS INDEX (BMI) 25.0-29.9: Status: ACTIVE | Noted: 2022-04-11

## 2022-04-12 ENCOUNTER — PATIENT MESSAGE (OUTPATIENT)
Dept: SLEEP MEDICINE | Facility: MEDICAL CENTER | Age: 67
End: 2022-04-12
Payer: MEDICARE

## 2022-04-22 ENCOUNTER — TELEPHONE (OUTPATIENT)
Dept: HEALTH INFORMATION MANAGEMENT | Facility: OTHER | Age: 67
End: 2022-04-22

## 2022-06-14 ENCOUNTER — HOME STUDY (OUTPATIENT)
Dept: SLEEP MEDICINE | Facility: MEDICAL CENTER | Age: 67
End: 2022-06-14
Attending: NURSE PRACTITIONER
Payer: MEDICARE

## 2022-06-14 DIAGNOSIS — G47.33 OSA (OBSTRUCTIVE SLEEP APNEA): ICD-10-CM

## 2022-06-14 PROCEDURE — 94762 N-INVAS EAR/PLS OXIMTRY CONT: CPT | Performed by: INTERNAL MEDICINE

## 2022-06-15 NOTE — PROCEDURES
Interpretation:    This overnight oximetry was recorded on 6/14/2018 with the patient on CPAP 11 cm water.  The analysis duration was 7 hours 46 minutes.  56 total oximetric events occurred encompassing 19.4 minutes .  The average event duration was 20.8 seconds .  The saturations were less than 90% for 1.0% of the recording.  The bijal saturation was 82 percent..  The patient spent 0.8 minutes with saturations < 88%.  Overall, this continuous nocturnal oximetry demonstrates normal saturation while on positive airway pressure therapy.      Recommendation:    Clinical correlation is needed.  Recommend continuing current therapy.

## 2022-07-29 ENCOUNTER — OFFICE VISIT (OUTPATIENT)
Dept: MEDICAL GROUP | Facility: MEDICAL CENTER | Age: 67
End: 2022-07-29
Payer: MEDICARE

## 2022-07-29 VITALS
DIASTOLIC BLOOD PRESSURE: 82 MMHG | HEART RATE: 68 BPM | OXYGEN SATURATION: 96 % | SYSTOLIC BLOOD PRESSURE: 112 MMHG | BODY MASS INDEX: 29.87 KG/M2 | HEIGHT: 64 IN | TEMPERATURE: 97.2 F

## 2022-07-29 DIAGNOSIS — J06.9 VIRAL URI WITH COUGH: ICD-10-CM

## 2022-07-29 PROCEDURE — 99213 OFFICE O/P EST LOW 20 MIN: CPT | Performed by: INTERNAL MEDICINE

## 2022-07-29 RX ORDER — BENZONATATE 200 MG/1
200 CAPSULE ORAL 3 TIMES DAILY PRN
Qty: 60 CAPSULE | Refills: 0 | Status: SHIPPED | OUTPATIENT
Start: 2022-07-29 | End: 2022-09-30

## 2022-07-29 RX ORDER — ALBUTEROL SULFATE 90 UG/1
2 AEROSOL, METERED RESPIRATORY (INHALATION) EVERY 4 HOURS PRN
Qty: 1 EACH | Refills: 0 | Status: SHIPPED | OUTPATIENT
Start: 2022-07-29 | End: 2022-08-26

## 2022-07-29 NOTE — PROGRESS NOTES
Established Patient    Mey presents today with the following:    CC:  cough    HPI:   Mey is a 67 y.o. female who came in for above.    Productive cough x 1 week. Initially had myalgia, HA, runny nose which are better now except for cough.  She takes care of her mother-in-law with cancer.  She would like to get better soon so that she can go back to taking care of her.  She was recommended Tessalon by a nurse and she would like to try it.    She has mild seasonal allergies.     ROS:   As above    Patient Active Problem List    Diagnosis Date Noted   • BMI 29.0-29.9,adult 04/11/2022   • Overweight with body mass index (BMI) 25.0-29.9 04/11/2022   • Syncope 04/11/2022   • Dizziness 10/28/2021   • Pure hypercholesterolemia 02/22/2021   • Plantar fasciitis 02/22/2021   • Obesity (BMI 30-39.9) 02/22/2021   • DONN (obstructive sleep apnea) 05/12/2020   • Non-smoker 05/12/2020   • Nocturnal hypoxia 12/06/2019   • Vitamin D insufficiency 12/06/2019   • Snoring 11/08/2019   • Finger pain, left 11/08/2019   • Swelling of finger joint of left hand 11/08/2019   • Osteopenia 11/08/2019   • Preventative health care 04/06/2016   • History of kidney stones 04/06/2016   • Headache 03/10/2016   • History of pneumonia 03/10/2016       Current Outpatient Medications   Medication Sig Dispense Refill   • benzonatate (TESSALON) 200 MG capsule Take 1 Capsule by mouth 3 times a day as needed for Cough. 60 Capsule 0   • albuterol 108 (90 Base) MCG/ACT Aero Soln inhalation aerosol Inhale 2 Puffs every four hours as needed for Shortness of Breath. 1 Each 0   • Multiple Vitamins-Minerals (CENTRUM SILVER 50+WOMEN PO)      • Biotin w/ Vitamins C & E (HAIR/SKIN/NAILS PO)      • Zinc Acetate, Oral, (ZINC ACETATE PO) Take  by mouth every day. (Patient not taking: Reported on 4/11/2022)     • SUPER B COMPLEX/C PO Take  by mouth.     • Cholecalciferol (D3 ADULT PO) Take  by mouth.       No current facility-administered medications for this  "visit.         /82 (BP Location: Left arm, Patient Position: Sitting, BP Cuff Size: Adult)   Pulse 68   Temp 36.2 °C (97.2 °F) (Temporal)   Ht 1.626 m (5' 4\")   SpO2 96%   BMI 29.87 kg/m²     Physical Exam  General: Alert and oriented, No apparent distress.   Throat: Clear no exudates noted.  Slight erythema and postnasal drip.  Neck: Supple. No cervical or supraclavicular lymphadenopathy noted. Thyroid not enlarged.  Lungs: Clear to auscultation bilaterally without any wheezing, crepitations.  Bronchial breath sounds present  Cardiovascular: Regular rate and rhythm. No murmurs, rubs or gallops.  Extremities: No  edema.       Assessment and Plan    1. Viral URI with cough  Acute bronchitis with and with hypersensitivity.  She could benefit from albuterol as needed.  Control postnasal drip with saline nasal rinse followed by Flonase.  Benzonatate as needed for symptom.  - benzonatate (TESSALON) 200 MG capsule; Take 1 Capsule by mouth 3 times a day as needed for Cough.  Dispense: 60 Capsule; Refill: 0  - albuterol 108 (90 Base) MCG/ACT Aero Soln inhalation aerosol; Inhale 2 Puffs every four hours as needed for Shortness of Breath.  Dispense: 1 Each; Refill: 0        Return if symptoms worsen or fail to improve.         Signed by: Amber Teixeira M.D.  "

## 2022-09-22 ENCOUNTER — APPOINTMENT (OUTPATIENT)
Dept: MEDICAL GROUP | Facility: MEDICAL CENTER | Age: 67
End: 2022-09-22
Payer: MEDICARE

## 2022-09-26 ENCOUNTER — NON-PROVIDER VISIT (OUTPATIENT)
Dept: MEDICAL GROUP | Facility: MEDICAL CENTER | Age: 67
End: 2022-09-26
Payer: MEDICARE

## 2022-09-26 DIAGNOSIS — Z23 NEED FOR VACCINATION: ICD-10-CM

## 2022-09-26 PROCEDURE — 90662 IIV NO PRSV INCREASED AG IM: CPT | Performed by: NURSE PRACTITIONER

## 2022-09-26 PROCEDURE — 99999 PR NO CHARGE: CPT | Performed by: NURSE PRACTITIONER

## 2022-09-26 PROCEDURE — G0008 ADMIN INFLUENZA VIRUS VAC: HCPCS | Performed by: NURSE PRACTITIONER

## 2022-09-30 ENCOUNTER — OFFICE VISIT (OUTPATIENT)
Dept: MEDICAL GROUP | Facility: MEDICAL CENTER | Age: 67
End: 2022-09-30
Payer: MEDICARE

## 2022-09-30 VITALS
OXYGEN SATURATION: 99 % | HEIGHT: 64 IN | HEART RATE: 70 BPM | SYSTOLIC BLOOD PRESSURE: 136 MMHG | BODY MASS INDEX: 29.94 KG/M2 | WEIGHT: 175.4 LBS | DIASTOLIC BLOOD PRESSURE: 80 MMHG | TEMPERATURE: 96.8 F

## 2022-09-30 DIAGNOSIS — Z13.29 SCREENING FOR THYROID DISORDER: ICD-10-CM

## 2022-09-30 DIAGNOSIS — E55.9 VITAMIN D INSUFFICIENCY: ICD-10-CM

## 2022-09-30 DIAGNOSIS — F41.1 GAD (GENERALIZED ANXIETY DISORDER): ICD-10-CM

## 2022-09-30 DIAGNOSIS — F33.1 MODERATE EPISODE OF RECURRENT MAJOR DEPRESSIVE DISORDER (HCC): ICD-10-CM

## 2022-09-30 DIAGNOSIS — E78.00 PURE HYPERCHOLESTEROLEMIA: ICD-10-CM

## 2022-09-30 DIAGNOSIS — Z13.228 ENCOUNTER FOR SCREENING FOR METABOLIC DISORDER: ICD-10-CM

## 2022-09-30 DIAGNOSIS — Z13.0 ENCOUNTER FOR SCREENING FOR HEMATOLOGIC DISORDER: ICD-10-CM

## 2022-09-30 PROCEDURE — 99214 OFFICE O/P EST MOD 30 MIN: CPT | Performed by: NURSE PRACTITIONER

## 2022-09-30 RX ORDER — SERTRALINE HYDROCHLORIDE 25 MG/1
25 TABLET, FILM COATED ORAL DAILY
Qty: 90 TABLET | Refills: 3 | Status: SHIPPED | OUTPATIENT
Start: 2022-09-30 | End: 2022-10-31 | Stop reason: SDUPTHER

## 2022-09-30 ASSESSMENT — FIBROSIS 4 INDEX: FIB4 SCORE: 1.2

## 2022-09-30 NOTE — ASSESSMENT & PLAN NOTE
Ongoing for the past 2 years. Does have family history of depression. Has had mulitple losses of parents and inlaws over the past few years, has been caring for them which has been stressful. Sister diagnosed with cyst in head. Currently caring for mother in law who has several medical issues.     Feels like she can't be happy, has a heaviness in her chest due to the stress. Feeling guilty and overwhelmed.     Has been golfing once weekly which is helpful. Hasn't been able to swim.     Does have trouble getting to sleep sometimes, but does sleep well once she falls asleep.    She would like to try a medication for this to help her get through. Her sister will be leaving out of the country for a month and she will be the sole caregiver for her mother in law.

## 2022-10-24 ENCOUNTER — APPOINTMENT (OUTPATIENT)
Dept: MEDICAL GROUP | Facility: MEDICAL CENTER | Age: 67
End: 2022-10-24
Payer: MEDICARE

## 2022-10-31 ENCOUNTER — OFFICE VISIT (OUTPATIENT)
Dept: MEDICAL GROUP | Facility: MEDICAL CENTER | Age: 67
End: 2022-10-31
Payer: MEDICARE

## 2022-10-31 VITALS
SYSTOLIC BLOOD PRESSURE: 106 MMHG | BODY MASS INDEX: 30.46 KG/M2 | OXYGEN SATURATION: 99 % | HEART RATE: 60 BPM | TEMPERATURE: 97.9 F | WEIGHT: 178.4 LBS | DIASTOLIC BLOOD PRESSURE: 70 MMHG | HEIGHT: 64 IN

## 2022-10-31 DIAGNOSIS — E66.9 OBESITY (BMI 30-39.9): ICD-10-CM

## 2022-10-31 DIAGNOSIS — F33.1 MODERATE EPISODE OF RECURRENT MAJOR DEPRESSIVE DISORDER (HCC): ICD-10-CM

## 2022-10-31 DIAGNOSIS — F41.1 GAD (GENERALIZED ANXIETY DISORDER): ICD-10-CM

## 2022-10-31 PROCEDURE — 99214 OFFICE O/P EST MOD 30 MIN: CPT | Performed by: NURSE PRACTITIONER

## 2022-10-31 ASSESSMENT — ANXIETY QUESTIONNAIRES
3. WORRYING TOO MUCH ABOUT DIFFERENT THINGS: NEARLY EVERY DAY
5. BEING SO RESTLESS THAT IT IS HARD TO SIT STILL: MORE THAN HALF THE DAYS
1. FEELING NERVOUS, ANXIOUS, OR ON EDGE: MORE THAN HALF THE DAYS
GAD7 TOTAL SCORE: 18
2. NOT BEING ABLE TO STOP OR CONTROL WORRYING: NEARLY EVERY DAY
IF YOU CHECKED OFF ANY PROBLEMS ON THIS QUESTIONNAIRE, HOW DIFFICULT HAVE THESE PROBLEMS MADE IT FOR YOU TO DO YOUR WORK, TAKE CARE OF THINGS AT HOME, OR GET ALONG WITH OTHER PEOPLE: VERY DIFFICULT
4. TROUBLE RELAXING: NEARLY EVERY DAY
7. FEELING AFRAID AS IF SOMETHING AWFUL MIGHT HAPPEN: MORE THAN HALF THE DAYS
6. BECOMING EASILY ANNOYED OR IRRITABLE: NEARLY EVERY DAY

## 2022-10-31 ASSESSMENT — PATIENT HEALTH QUESTIONNAIRE - PHQ9
1. LITTLE INTEREST OR PLEASURE IN DOING THINGS: SEVERAL DAYS
4. FEELING TIRED OR HAVING LITTLE ENERGY: NEARLY EVERY DAY
8. MOVING OR SPEAKING SO SLOWLY THAT OTHER PEOPLE COULD HAVE NOTICED. OR THE OPPOSITE, BEING SO FIGETY OR RESTLESS THAT YOU HAVE BEEN MOVING AROUND A LOT MORE THAN USUAL: NOT AT ALL
6. FEELING BAD ABOUT YOURSELF - OR THAT YOU ARE A FAILURE OR HAVE LET YOURSELF OR YOUR FAMILY DOWN: MORE THAN HALF THE DAYS
7. TROUBLE CONCENTRATING ON THINGS, SUCH AS READING THE NEWSPAPER OR WATCHING TELEVISION: NOT AT ALL
2. FEELING DOWN, DEPRESSED, IRRITABLE, OR HOPELESS: NEARLY EVERY DAY
SUM OF ALL RESPONSES TO PHQ QUESTIONS 1-9: 12
3. TROUBLE FALLING OR STAYING ASLEEP OR SLEEPING TOO MUCH: MORE THAN HALF THE DAYS
9. THOUGHTS THAT YOU WOULD BE BETTER OFF DEAD, OR OF HURTING YOURSELF: NOT AT ALL
5. POOR APPETITE OR OVEREATING: SEVERAL DAYS
SUM OF ALL RESPONSES TO PHQ9 QUESTIONS 1 AND 2: 4

## 2022-10-31 ASSESSMENT — FIBROSIS 4 INDEX: FIB4 SCORE: 1.2

## 2022-10-31 NOTE — ASSESSMENT & PLAN NOTE
Ongoing for the past 2 years. Does have family history of depression. Has had mulitple losses of parents and inlaws over the past few years, has been caring for them which has been stressful. Sister diagnosed with cyst in head. Mother in law recently passed away on Hospice.     Feels like she can't be happy, has a heaviness in her chest due to the stress. Feeling guilty and overwhelmed.     Has been golfing once weekly which is helpful. Hasn't been able to swim.     Does have trouble getting to sleep sometimes, but does sleep well once she falls asleep.    Recently started sertraline 25 mg daily 4 weeks ago, has not noticed any significant improvement with this. Denies side effects.

## 2022-10-31 NOTE — PROGRESS NOTES
"Subjective:   Mey Araujo is a 67 y.o. female here today for follow up depression    Moderate episode of recurrent major depressive disorder (HCC)  Ongoing for the past 2 years. Does have family history of depression. Has had mulitple losses of parents and inlaws over the past few years, has been caring for them which has been stressful. Sister diagnosed with cyst in head. Mother in law recently passed away on Hospice.     Feels like she can't be happy, has a heaviness in her chest due to the stress. Feeling guilty and overwhelmed.     Has been golfing once weekly which is helpful. Hasn't been able to swim.     Does have trouble getting to sleep sometimes, but does sleep well once she falls asleep.    Recently started sertraline 25 mg daily 4 weeks ago, has not noticed any significant improvement with this. Denies side effects.        Current medicines (including changes today)  Current Outpatient Medications   Medication Sig Dispense Refill    sertraline (ZOLOFT) 50 MG Tab Take 1 Tablet by mouth every day. 90 Tablet 3    Multiple Vitamins-Minerals (CENTRUM SILVER 50+WOMEN PO)       Cholecalciferol (D3 ADULT PO) Take  by mouth.      Biotin w/ Vitamins C & E (HAIR/SKIN/NAILS PO)  (Patient not taking: Reported on 10/31/2022)       No current facility-administered medications for this visit.     She  has a past medical history of Allergic rhinitis, Kidney stone, and Osteopenia (11/8/2019).    ROS   No chest pain, no shortness of breath, no abdominal pain  Positive ROS as per HPI.  All other systems reviewed and are negative.     Objective:     /70 (BP Location: Right arm, Patient Position: Sitting, BP Cuff Size: Adult)   Pulse 60   Temp 36.6 °C (97.9 °F) (Temporal)   Ht 1.626 m (5' 4\")   Wt 80.9 kg (178 lb 6.4 oz)   SpO2 99%  Body mass index is 30.62 kg/m².     Physical Exam:  Constitutional: Alert, no distress.  Skin: Warm, dry, good turgor, no rashes in visible areas.  Eye: Equal, round and " reactive, conjunctiva clear, lids normal.  ENMT: Mask in place  Respiratory: Unlabored respiratory effort  Psych: Alert and oriented x3, normal affect and mood.        Assessment and Plan:   The following treatment plan was discussed    1. Moderate episode of recurrent major depressive disorder (HCC)  Unstable  Increase sertraline to 50 mg daily  - sertraline (ZOLOFT) 50 MG Tab; Take 1 Tablet by mouth every day.  Dispense: 90 Tablet; Refill: 3    2. RICHARD (generalized anxiety disorder)  Unstable  Increase sertraline to 50 mg daily  - sertraline (ZOLOFT) 50 MG Tab; Take 1 Tablet by mouth every day.  Dispense: 90 Tablet; Refill: 3      Followup: Return in about 4 weeks (around 11/28/2022) for Depression/Anxiety.    The MA is performing the above orders under the direction of Dr. Machado    Please note that this dictation was created using voice recognition software. I have made every reasonable attempt to correct obvious errors, but I expect that there are errors of grammar and possibly content that I did not discover before finalizing the note.

## 2022-11-03 ENCOUNTER — DOCUMENTATION (OUTPATIENT)
Dept: HEALTH INFORMATION MANAGEMENT | Facility: OTHER | Age: 67
End: 2022-11-03
Payer: MEDICARE

## 2022-11-21 ENCOUNTER — HOSPITAL ENCOUNTER (OUTPATIENT)
Dept: LAB | Facility: MEDICAL CENTER | Age: 67
End: 2022-11-21
Attending: NURSE PRACTITIONER
Payer: MEDICARE

## 2022-11-21 DIAGNOSIS — E55.9 VITAMIN D INSUFFICIENCY: ICD-10-CM

## 2022-11-21 DIAGNOSIS — E78.00 PURE HYPERCHOLESTEROLEMIA: ICD-10-CM

## 2022-11-21 DIAGNOSIS — Z13.228 ENCOUNTER FOR SCREENING FOR METABOLIC DISORDER: ICD-10-CM

## 2022-11-21 DIAGNOSIS — Z13.29 SCREENING FOR THYROID DISORDER: ICD-10-CM

## 2022-11-21 DIAGNOSIS — Z13.0 ENCOUNTER FOR SCREENING FOR HEMATOLOGIC DISORDER: ICD-10-CM

## 2022-11-21 LAB
25(OH)D3 SERPL-MCNC: 40 NG/ML (ref 30–100)
ALBUMIN SERPL BCP-MCNC: 4.4 G/DL (ref 3.2–4.9)
ALBUMIN/GLOB SERPL: 1.5 G/DL
ALP SERPL-CCNC: 85 U/L (ref 30–99)
ALT SERPL-CCNC: 15 U/L (ref 2–50)
ANION GAP SERPL CALC-SCNC: 10 MMOL/L (ref 7–16)
AST SERPL-CCNC: 20 U/L (ref 12–45)
BASOPHILS # BLD AUTO: 1.5 % (ref 0–1.8)
BASOPHILS # BLD: 0.07 K/UL (ref 0–0.12)
BILIRUB SERPL-MCNC: 0.7 MG/DL (ref 0.1–1.5)
BUN SERPL-MCNC: 20 MG/DL (ref 8–22)
CALCIUM SERPL-MCNC: 9.4 MG/DL (ref 8.5–10.5)
CHLORIDE SERPL-SCNC: 105 MMOL/L (ref 96–112)
CHOLEST SERPL-MCNC: 197 MG/DL (ref 100–199)
CO2 SERPL-SCNC: 24 MMOL/L (ref 20–33)
CREAT SERPL-MCNC: 0.71 MG/DL (ref 0.5–1.4)
EOSINOPHIL # BLD AUTO: 0.33 K/UL (ref 0–0.51)
EOSINOPHIL NFR BLD: 7.2 % (ref 0–6.9)
ERYTHROCYTE [DISTWIDTH] IN BLOOD BY AUTOMATED COUNT: 47.8 FL (ref 35.9–50)
FASTING STATUS PATIENT QL REPORTED: NORMAL
GFR SERPLBLD CREATININE-BSD FMLA CKD-EPI: 94 ML/MIN/1.73 M 2
GLOBULIN SER CALC-MCNC: 3 G/DL (ref 1.9–3.5)
GLUCOSE SERPL-MCNC: 88 MG/DL (ref 65–99)
HCT VFR BLD AUTO: 42.6 % (ref 37–47)
HDLC SERPL-MCNC: 76 MG/DL
HGB BLD-MCNC: 13.9 G/DL (ref 12–16)
IMM GRANULOCYTES # BLD AUTO: 0.01 K/UL (ref 0–0.11)
IMM GRANULOCYTES NFR BLD AUTO: 0.2 % (ref 0–0.9)
LDLC SERPL CALC-MCNC: 106 MG/DL
LYMPHOCYTES # BLD AUTO: 1.96 K/UL (ref 1–4.8)
LYMPHOCYTES NFR BLD: 42.7 % (ref 22–41)
MCH RBC QN AUTO: 28.5 PG (ref 27–33)
MCHC RBC AUTO-ENTMCNC: 32.6 G/DL (ref 33.6–35)
MCV RBC AUTO: 87.5 FL (ref 81.4–97.8)
MONOCYTES # BLD AUTO: 0.41 K/UL (ref 0–0.85)
MONOCYTES NFR BLD AUTO: 8.9 % (ref 0–13.4)
NEUTROPHILS # BLD AUTO: 1.81 K/UL (ref 2–7.15)
NEUTROPHILS NFR BLD: 39.5 % (ref 44–72)
NRBC # BLD AUTO: 0 K/UL
NRBC BLD-RTO: 0 /100 WBC
PLATELET # BLD AUTO: 272 K/UL (ref 164–446)
PMV BLD AUTO: 10.8 FL (ref 9–12.9)
POTASSIUM SERPL-SCNC: 4.6 MMOL/L (ref 3.6–5.5)
PROT SERPL-MCNC: 7.4 G/DL (ref 6–8.2)
RBC # BLD AUTO: 4.87 M/UL (ref 4.2–5.4)
SODIUM SERPL-SCNC: 139 MMOL/L (ref 135–145)
TRIGL SERPL-MCNC: 77 MG/DL (ref 0–149)
TSH SERPL DL<=0.005 MIU/L-ACNC: 4.14 UIU/ML (ref 0.38–5.33)
WBC # BLD AUTO: 4.6 K/UL (ref 4.8–10.8)

## 2022-11-21 PROCEDURE — 80053 COMPREHEN METABOLIC PANEL: CPT

## 2022-11-21 PROCEDURE — 82306 VITAMIN D 25 HYDROXY: CPT

## 2022-11-21 PROCEDURE — 84443 ASSAY THYROID STIM HORMONE: CPT

## 2022-11-21 PROCEDURE — 85025 COMPLETE CBC W/AUTO DIFF WBC: CPT

## 2022-11-21 PROCEDURE — 36415 COLL VENOUS BLD VENIPUNCTURE: CPT

## 2022-11-21 PROCEDURE — 80061 LIPID PANEL: CPT

## 2022-12-01 ENCOUNTER — TELEMEDICINE (OUTPATIENT)
Dept: MEDICAL GROUP | Facility: MEDICAL CENTER | Age: 67
End: 2022-12-01
Payer: MEDICARE

## 2022-12-01 VITALS — TEMPERATURE: 98.2 F | WEIGHT: 178 LBS | HEIGHT: 64 IN | BODY MASS INDEX: 30.39 KG/M2

## 2022-12-01 DIAGNOSIS — E78.00 PURE HYPERCHOLESTEROLEMIA: ICD-10-CM

## 2022-12-01 DIAGNOSIS — F33.1 MODERATE EPISODE OF RECURRENT MAJOR DEPRESSIVE DISORDER (HCC): ICD-10-CM

## 2022-12-01 DIAGNOSIS — F41.1 GAD (GENERALIZED ANXIETY DISORDER): ICD-10-CM

## 2022-12-01 PROCEDURE — 99214 OFFICE O/P EST MOD 30 MIN: CPT | Mod: 95 | Performed by: NURSE PRACTITIONER

## 2022-12-01 ASSESSMENT — FIBROSIS 4 INDEX: FIB4 SCORE: 1.23

## 2022-12-01 NOTE — ASSESSMENT & PLAN NOTE
Ongoing for the past 2 years. Does have family history of depression. Has had mulitple losses of parents and inlaws over the past few years, has been caring for them which has been stressful. Sister diagnosed with cyst in head. Mother in law recently passed away on Hospice.     Sertraline was increased to 50 mg about 4 weeks ago and symptoms have significantly improved.     Has been golfing once weekly which is helpful.     Sleep has been better

## 2022-12-01 NOTE — PROGRESS NOTES
Telemedicine: Established Patient   This evaluation was conducted via Zoom using secure and encrypted videoconferencing technology. The patient was in their home in the [facility].    The patient's identity was confirmed and verbal consent was obtained for this virtual visit.    Subjective:   CC: depression follow up, lab review  Mey Araujo is a 67 y.o. female presenting for evaluation and management of:    Moderate episode of recurrent major depressive disorder (HCC)  Ongoing for the past 2 years. Does have family history of depression. Has had mulitple losses of parents and inlaws over the past few years, has been caring for them which has been stressful. Sister diagnosed with cyst in head. Mother in law recently passed away on Hospice.     Sertraline was increased to 50 mg about 4 weeks ago and symptoms have significantly improved.     Has been golfing once weekly which is helpful.     Sleep has been better    RICHARD (generalized anxiety disorder)  Stable on sertraline 50 mg daily.     Pure hypercholesterolemia  Chronic, mild.  on recent labs.     The 10-year ASCVD risk score ([place] DK, et al., 2019) is: 3.4%     ROS   Positive ROS as per HPI. All other systems reviewed and are negative.    Allergies   Allergen Reactions    Pcn [Penicillins] Rash     Rash      Hydrocodone      Itchy nose         Current medicines (including changes today)  Current Outpatient Medications   Medication Sig Dispense Refill    Bioflavonoid Products (SUPER-C 1000 PO)       B Complex-Biotin-FA (SUPER B-100 PO)       Probiotic Product (PROBIOTIC DAILY PO)       sertraline (ZOLOFT) 50 MG Tab Take 1 Tablet by mouth every day. 90 Tablet 3    Multiple Vitamins-Minerals (CENTRUM SILVER 50+WOMEN PO)       Cholecalciferol (D3 ADULT PO) Take  by mouth.       No current facility-administered medications for this visit.       Patient Active Problem List    Diagnosis Date Noted    Moderate episode of recurrent major depressive  "disorder (HCC) 09/30/2022    RICHARD (generalized anxiety disorder) 09/30/2022    BMI 29.0-29.9,adult 04/11/2022    Overweight with body mass index (BMI) 25.0-29.9 04/11/2022    Syncope 04/11/2022    Dizziness 10/28/2021    Pure hypercholesterolemia 02/22/2021    Plantar fasciitis 02/22/2021    Obesity (BMI 30-39.9) 02/22/2021    ODNN (obstructive sleep apnea) 05/12/2020    Non-smoker 05/12/2020    Nocturnal hypoxia 12/06/2019    Vitamin D insufficiency 12/06/2019    Snoring 11/08/2019    Finger pain, left 11/08/2019    Swelling of finger joint of left hand 11/08/2019    Osteopenia 11/08/2019    Preventative health care 04/06/2016    History of kidney stones 04/06/2016    Headache 03/10/2016    History of pneumonia 03/10/2016       Family History   Problem Relation Age of Onset    Cancer Father         prostate ca, skin cancer.     Lung Disease Father         pulmonary fibrosis    Other Sister         sleep apnea    Psychiatric Illness Sister         depression       She  has a past medical history of Allergic rhinitis, Kidney stone, and Osteopenia (11/8/2019).  She  has a past surgical history that includes nephrolithotomy; tonsillectomy and adenoidectomy; tonsillectomy; and sinuscope.       Objective:   Temp 36.8 °C (98.2 °F) (Temporal)   Ht 1.626 m (5' 4\")   Wt 80.7 kg (178 lb)   BMI 30.55 kg/m²     Physical Exam  Constitutional: Alert, no distress, well-groomed.  Skin: No rashes in visible areas.  Eye: Round. Conjunctiva clear, lids normal. No icterus.   ENMT: Lips pink without lesions, good dentition, moist mucous membranes. Phonation normal.  Neck: No masses, no thyromegaly. Moves freely without pain.  CV: Pulse as reported by patient  Respiratory: Unlabored respiratory effort, no cough or audible wheeze  Psych: Alert and oriented x3, normal affect and mood.       Assessment and Plan:   The following treatment plan was discussed:     1. Moderate episode of recurrent major depressive disorder " (HCC)  Stable  Continue sertraline 50 mg daily  She will notify me if she would like to taper off of this in the future.     2. RICHARD (generalized anxiety disorder)  Stable on sertraline    3. Pure hypercholesterolemia  Stable  Labs reviewed    Other orders  - Probiotic Product (PROBIOTIC DAILY PO)  - Bioflavonoid Products (SUPER-C 1000 PO)  - B Complex-Biotin-FA (SUPER B-100 PO)        Follow-up: Return in about 1 year (around 12/1/2023).

## 2022-12-01 NOTE — ASSESSMENT & PLAN NOTE
Chronic, mild.  on recent labs.     The 10-year ASCVD risk score ([place] FELICIANO, et al., 2019) is: 3.4%

## 2022-12-21 ENCOUNTER — OFFICE VISIT (OUTPATIENT)
Dept: SLEEP MEDICINE | Facility: MEDICAL CENTER | Age: 67
End: 2022-12-21
Payer: MEDICARE

## 2022-12-21 VITALS
HEART RATE: 63 BPM | SYSTOLIC BLOOD PRESSURE: 122 MMHG | DIASTOLIC BLOOD PRESSURE: 74 MMHG | BODY MASS INDEX: 31.24 KG/M2 | OXYGEN SATURATION: 99 % | WEIGHT: 183 LBS | HEIGHT: 64 IN

## 2022-12-21 DIAGNOSIS — G47.33 OSA (OBSTRUCTIVE SLEEP APNEA): ICD-10-CM

## 2022-12-21 PROCEDURE — 99213 OFFICE O/P EST LOW 20 MIN: CPT | Performed by: NURSE PRACTITIONER

## 2022-12-21 ASSESSMENT — FIBROSIS 4 INDEX: FIB4 SCORE: 1.23

## 2022-12-21 NOTE — PROGRESS NOTES
"Chief Complaint   Patient presents with    Apnea     DONN-Last seen 04/07/2022    Results     OPO- 06/14/2022       HPI:      Mrs. Araujo is a 66 y/o female patient who is in today for DONN f/u. PMH includes pure hypercholesterolemia, obesity, dizziness, DONN, never smoker, T&A, headache, h/o syncope, COVID-19 positive antibodies.    Patient has a ResMed CPAP machine that she obtained around June 2020.  Patient reports that she inherited a ResMed machine from her mother-in-law who recently passed away and would like to use it as a spare when she travels.  Compliance report from 11/21/22-12/20/22 was downloaded and reviewed with the patient which showed CPAP 11 cmH2O, 90% compliance, 8 hrs 12 min use, AHI of 1.7. She is tolerating the pressure and mask well. She denies morning headache or snoring.  She goes to bed at 11 PM and wakes up between 6:30 and 7 AM. She is able to sleep through the night without any problems and more soundly since being on CPAP therapy. She does suffer from seasonal allergies and gets very itchy eyes, nose and throat.  She takes over-the-counter Claritin which \"takes the edge off\".  She stays active by walking 45 minutes 2-3 times a week, gardening, golfing and kayaking when able.  She also goes swimming 2-3 times a week for an hour.  Patient has reviewed bradycardia and prior syncopal episodes with PCP and it was not indicated at that she needs any further intervention.  The bradycardia could be congenital patient.  Patient denies any fainting spells since last office visit in April 2022.  She denies any new health problems or medications.    Sleep Study History:   OPO on CPAP 11 cmH2O from 6/14/22 indicated the bijal saturation was 82 percent..  The patient spent 0.8 minutes with saturations < 88%.  Overall, this continuous nocturnal oximetry demonstrates normal saturation while on positive airway pressure therapy.    PSG split night study from 5/31/20 indicated very severe obstructive " sleep apnea hypopnea with an apnea hypopnea index of 59.3 events per hour.  Severe nocturnal hypoxemia with a lowest arterial oxygen saturation of 73% on room air. Fragmentation of sleep related to the breathing events and to periodic limb movements.  There is an excellent response to CPAP therapy.  Some treatment emergent central apneas are identified at the end of the CPAP titration suggesting an over titration effect but complex sleep apnea cannot be excluded.    ROS:    Constitutional: Denies fevers, Denies weight changes  Eyes: Denies changes in vision, no eye pain  Ears/Nose/Throat/Mouth: Denies nasal congestion or sore throat   Cardiovascular: Denies chest pain or palpitations   Respiratory: Denies shortness of breath , Denies cough  Gastrointestinal/Hepatic: Denies abdominal pain, nausea, vomiting,   Skin/Breast: Denies rash,   Neurological: Denies headache, confusion,   Psychiatric: denies mood disorder   Sleep: denies snoring       Past Medical History:   Diagnosis Date    Allergic rhinitis     Kidney stone     Osteopenia 11/8/2019       Past Surgical History:   Procedure Laterality Date    NEPHROLITHOTOMY      SINUSCOPE      TONSILLECTOMY      TONSILLECTOMY AND ADENOIDECTOMY         Family History   Problem Relation Age of Onset    Cancer Father         prostate ca, skin cancer.     Lung Disease Father         pulmonary fibrosis    Other Sister         sleep apnea    Psychiatric Illness Sister         depression       Social History     Socioeconomic History    Marital status:      Spouse name: Not on file    Number of children: Not on file    Years of education: Not on file    Highest education level: Not on file   Occupational History    Not on file   Tobacco Use    Smoking status: Never    Smokeless tobacco: Never   Vaping Use    Vaping Use: Never used   Substance and Sexual Activity    Alcohol use: Yes     Comment: 2 glasses wine 3 d/wk    Drug use: No    Sexual activity: Not on file   Other  Topics Concern    Not on file   Social History Narrative    Not on file     Social Determinants of Health     Financial Resource Strain: Not on file   Food Insecurity: Not on file   Transportation Needs: Not on file   Physical Activity: Not on file   Stress: Not on file   Social Connections: Not on file   Intimate Partner Violence: Not on file   Housing Stability: Not on file       Allergies as of 12/21/2022 - Reviewed 12/21/2022   Allergen Reaction Noted    Pcn [penicillins] Rash 03/07/2016    Hydrocodone  03/10/2016        Vitals:  Vitals:    12/21/22 1109   BP: 122/74   Pulse: 63   SpO2: 99%       Current medications as of today   Current Outpatient Medications   Medication Sig Dispense Refill    Probiotic Product (PROBIOTIC DAILY PO)       Bioflavonoid Products (SUPER-C 1000 PO)       B Complex-Biotin-FA (SUPER B-100 PO)       sertraline (ZOLOFT) 50 MG Tab Take 1 Tablet by mouth every day. 90 Tablet 3    Multiple Vitamins-Minerals (CENTRUM SILVER 50+WOMEN PO)       Cholecalciferol (D3 ADULT PO) Take  by mouth.       No current facility-administered medications for this visit.         Physical Exam: Limited by COVID-19 precautions.  Appearance: Well developed, well nourished, no acute distress  Eyes: PERRL, EOM intact, sclera white, conjunctiva moist  Ears: no lesions or deformities  Hearing: grossly intact  Nose: no lesions or deformities  Respiratory effort: no intercostal retractions or use of accessory muscles  Extremities: no cyanosis or edema  Abdomen: soft   Gait and Station: normal  Digits and nails: no clubbing, cyanosis, petechiae or nodes.  Cranial nerves: grossly intact  Skin: no visible rashes, lesions or ulcers noted  Orientation: Oriented to time, person and place  Mood and affect: mood and affect appropriate, normal interaction with examiner  Judgement: Intact    Assessment:  1. DONN (obstructive sleep apnea)  DME Mask and Supplies      2. BMI 31.0-31.9,adult              Plan  Discussed the  cardiovascular and neuropsychiatric risks of untreated DONN; including but not limited to: HTN, DM, MI, ASCVD, CVA, CHF, traffic accidents.     1. [employer]iance report from 11/21/22-12/20/22 was downloaded and reviewed with the patient which showed CPAP 11 cmH2O, 90% compliance, 8 hrs 12 min use, AHI of 1.7.  Patient is compliant and benefiting from CPAP therapy for management of DONN. Advised patient to continue to use the CPAP every night for more than four hours for optimal health.    *DME order ([facility]) for mask (small Airfit N30i mask or MOC) and supplies was placed today. Continue to clean mask and supplies weekly with soap and water, and change supplies per insurance guidelines.     *OPO on CPAP 11 cmH2O from 6/14/22 indicated the bijal saturation was 82 percent..  The patient spent 0.8 minutes with saturations < 88%.  Overall, this continuous nocturnal oximetry demonstrates normal saturation while on positive airway pressure therapy. Continue with therapy.     *Patients spare ResMed machine was set to CPAP and 11 cmH2O.     *Sleep hygiene discussed. Recommend keeping a set sleep/wake schedule. Logging enough hours of sleep. Limiting/Avoiding naps. No caffeine after noon and no heavy meals in the evening.     2.  Continue to stay active.   If your BMI is 25-29.9 you are overweight. If your BMI is 30 or greater you are obese. To lose weight eat less, move more, or both. Any diet that reduces caloric intake can help with weight loss. Extra weight may reduce your lifespan. Avoid dramatic unsustainable dietary changes that result in the yo-yo effect (down then back up.)  Usually small modifications in diet and exercise are easier to stick with.  3. Follow up with appropriate healthcare providers for all other medical problems.  4. F/u in 1 year for DONN, sooner if needed.       GALINDO Garrison.      This dictation was created using voice recognition software. The accuracy of the dictation is limited to the abilities  of the software. I expect there may be some errors of grammar and possibly content.

## 2023-01-04 NOTE — PROGRESS NOTES
"Subjective:   Mey Araujo is a 67 y.o. female here today for depression, anxiety    Moderate episode of recurrent major depressive disorder (HCC)  Ongoing for the past 2 years. Does have family history of depression. Has had mulitple losses of parents and inlaws over the past few years, has been caring for them which has been stressful. Sister diagnosed with cyst in head. Currently caring for mother in law who has several medical issues.     Feels like she can't be happy, has a heaviness in her chest due to the stress. Feeling guilty and overwhelmed.     Has been golfing once weekly which is helpful. Hasn't been able to swim.     Does have trouble getting to sleep sometimes, but does sleep well once she falls asleep.    She would like to try a medication for this to help her get through. Her sister will be leaving out of the country for a month and she will be the sole caregiver for her mother in law.     RICHARD (generalized anxiety disorder)  Having severe anxiety.        Current medicines (including changes today)  Current Outpatient Medications   Medication Sig Dispense Refill    Probiotic Product (PROBIOTIC DAILY PO)       Bioflavonoid Products (SUPER-C 1000 PO)       B Complex-Biotin-FA (SUPER B-100 PO)       sertraline (ZOLOFT) 50 MG Tab Take 1 Tablet by mouth every day. 90 Tablet 3    Multiple Vitamins-Minerals (CENTRUM SILVER 50+WOMEN PO)       Cholecalciferol (D3 ADULT PO) Take  by mouth.       No current facility-administered medications for this visit.     She  has a past medical history of Allergic rhinitis, Kidney stone, and Osteopenia (11/8/2019).    ROS   No chest pain, no shortness of breath, no abdominal pain  Positive ROS as per HPI.  All other systems reviewed and are negative.     Objective:     /80   Pulse 70   Temp 36 °C (96.8 °F) (Temporal)   Ht 1.626 m (5' 4\")   Wt 79.6 kg (175 lb 6.4 oz)   SpO2 99%  Body mass index is 30.11 kg/m².     Physical Exam:  Constitutional: Alert, " no distress.  Skin: Warm, dry, good turgor, no rashes in visible areas.  Eye: Equal, round and reactive, conjunctiva clear, lids normal.  ENMT: Mask in place  Respiratory: Unlabored respiratory effort  Psych: Alert and oriented x3, normal affect and mood.        Assessment and Plan:   The following treatment plan was discussed    1. Moderate episode of recurrent major depressive disorder (HCC)  Unstable  Discussed options such as therapy/counseling, stress reduction, and medication  She would like to move forward with medication today  Start sertraline 25 mg daily    2. Encounter for screening for hematologic disorder  - CBC WITH DIFFERENTIAL; Future    3. Encounter for screening for metabolic disorder  - Comp Metabolic Panel; Future    4. Pure hypercholesterolemia  - Lipid Profile; Future    5. Vitamin D insufficiency  - VITAMIN D,25 HYDROXY (DEFICIENCY); Future    6. Screening for thyroid disorder  - TSH WITH REFLEX TO FT4; Future    7. RICHARD (generalized anxiety disorder)  Unstable  Start sertraline 25 mg daily      Followup: Return in about 4 weeks (around 10/28/2022) for Depression/Anxiety, Lab Review.    The MA is performing the above orders under the direction of Dr. Machado    Please note that this dictation was created using voice recognition software. I have made every reasonable attempt to correct obvious errors, but I expect that there are errors of grammar and possibly content that I did not discover before finalizing the note.

## 2023-01-25 PROBLEM — R32 URINARY INCONTINENCE: Status: ACTIVE | Noted: 2023-01-25

## 2023-01-30 ENCOUNTER — APPOINTMENT (OUTPATIENT)
Dept: MEDICAL GROUP | Facility: MEDICAL CENTER | Age: 68
End: 2023-01-30
Payer: MEDICARE

## 2023-02-03 ENCOUNTER — TELEMEDICINE (OUTPATIENT)
Dept: MEDICAL GROUP | Facility: MEDICAL CENTER | Age: 68
End: 2023-02-03
Payer: MEDICARE

## 2023-02-03 VITALS — HEIGHT: 64 IN | TEMPERATURE: 98.6 F | WEIGHT: 180 LBS | BODY MASS INDEX: 30.73 KG/M2

## 2023-02-03 DIAGNOSIS — M79.672 LEFT FOOT PAIN: ICD-10-CM

## 2023-02-03 DIAGNOSIS — M85.89 OSTEOPENIA OF MULTIPLE SITES: ICD-10-CM

## 2023-02-03 DIAGNOSIS — R19.7 DIARRHEA OF PRESUMED INFECTIOUS ORIGIN: ICD-10-CM

## 2023-02-03 PROCEDURE — 99214 OFFICE O/P EST MOD 30 MIN: CPT | Mod: 95 | Performed by: NURSE PRACTITIONER

## 2023-02-03 RX ORDER — AZITHROMYCIN 500 MG/1
500 TABLET, FILM COATED ORAL DAILY
Qty: 3 TABLET | Refills: 0 | Status: SHIPPED | OUTPATIENT
Start: 2023-02-03 | End: 2023-02-06

## 2023-02-03 ASSESSMENT — FIBROSIS 4 INDEX: FIB4 SCORE: 1.23

## 2023-02-03 NOTE — ASSESSMENT & PLAN NOTE
Started around December with intermittent bouts of diarrhea. Some days she has soft stools and other days watery stools. Her  has been having the same symptoms. Her sister in law returned from [facility] early December and was also having these symptoms.     Has upset stomach, feels crummy overall, malaise.

## 2023-02-03 NOTE — PROGRESS NOTES
Telemedicine: Established Patient   This evaluation was conducted via Zoom using secure and encrypted videoconferencing technology. The patient was in their home in the [facility].    The patient's identity was confirmed and verbal consent was obtained for this virtual visit.    Subjective:   CC: Diarrhea  Mey Araujo is a 67 y.o. female presenting for evaluation and management of:    Diarrhea of presumed infectious origin  Started around December with intermittent bouts of diarrhea. Some days she has soft stools and other days watery stools. Her  has been having the same symptoms. Her sister in law returned from [facility] early December and was also having these symptoms.     Has upset stomach, feels crummy overall, malaise.        ROS   Positive ROS as per HPI. All other systems reviewed and are negative.    Allergies   Allergen Reactions    Pcn [Penicillins] Rash     Rash      Hydrocodone      Itchy nose         Current medicines (including changes today)  Current Outpatient Medications   Medication Sig Dispense Refill    azithromycin (ZITHROMAX) 500 MG tablet Take 1 Tablet by mouth every day for 3 days. 3 Tablet 0    Probiotic Product (PROBIOTIC DAILY PO)       Bioflavonoid Products (SUPER-C 1000 PO)       B Complex-Biotin-FA (SUPER B-100 PO)       sertraline (ZOLOFT) 50 MG Tab Take 1 Tablet by mouth every day. 90 Tablet 3    Multiple Vitamins-Minerals (CENTRUM SILVER 50+WOMEN PO)       Cholecalciferol (D3 ADULT PO) Take  by mouth.       No current facility-administered medications for this visit.       Patient Active Problem List    Diagnosis Date Noted    Left foot pain 02/03/2023    BMI 31.0-31.9,adult 01/25/2023    Urinary incontinence 01/25/2023    Moderate episode of recurrent major depressive disorder (HCC) 09/30/2022    RICHARD (generalized anxiety disorder) 09/30/2022    BMI 29.0-29.9,adult 04/11/2022    Overweight with body mass index (BMI) 25.0-29.9 04/11/2022    Syncope 04/11/2022  "   Dizziness 10/28/2021    Pure hypercholesterolemia 02/22/2021    Plantar fasciitis 02/22/2021    Obesity (BMI 30-39.9) 02/22/2021    DONN (obstructive sleep apnea) 05/12/2020    Non-smoker 05/12/2020    Nocturnal hypoxia 12/06/2019    Vitamin D insufficiency 12/06/2019    Snoring 11/08/2019    Finger pain, left 11/08/2019    Swelling of finger joint of left hand 11/08/2019    Osteopenia of multiple sites 11/08/2019    Preventative health care 04/06/2016    History of kidney stones 04/06/2016    Diarrhea of presumed infectious origin 03/12/2016    Headache 03/10/2016    History of pneumonia 03/10/2016       Family History   Problem Relation Age of Onset    Cancer Father         prostate ca, skin cancer.     Lung Disease Father         pulmonary fibrosis    Other Sister         sleep apnea    Psychiatric Illness Sister         depression       She  has a past medical history of Allergic rhinitis, Kidney stone, and Osteopenia (11/8/2019).  She  has a past surgical history that includes nephrolithotomy; tonsillectomy and adenoidectomy; tonsillectomy; and sinuscope.       Objective:   Temp 37 °C (98.6 °F)   Ht 1.626 m (5' 4\")   Wt 81.6 kg (180 lb)   BMI 30.90 kg/m²     Physical Exam:  Constitutional: Alert, no distress, well-groomed.  Skin: No rashes in visible areas.  Eye: Round. Conjunctiva clear, lids normal. No icterus.   ENMT: Lips pink without lesions, good dentition, moist mucous membranes. Phonation normal.  Neck: No masses, no thyromegaly. Moves freely without pain.  CV: Pulse as reported by patient  Respiratory: Unlabored respiratory effort, no cough or audible wheeze  Psych: Alert and oriented x3, normal affect and mood.       Assessment and Plan:   The following treatment plan was discussed:     1. Diarrhea of presumed infectious origin  Unstable  Likely infectious diarrhea, possibly transmitted from family member who traveled recently.  We will treat with azithromycin 500 mg daily for 3 days.  She will " report if symptoms do not resolve.  - azithromycin (ZITHROMAX) 500 MG tablet; Take 1 Tablet by mouth every day for 3 days.  Dispense: 3 Tablet; Refill: 0    2. Osteopenia of multiple sites  - DS-BONE DENSITY STUDY (DEXA); Future    3. Left foot pain  Unstable  Established with podiatry for evaluation and treatment.  - Referral to Podiatry        Follow-up: Return if symptoms worsen or fail to improve.

## 2023-02-23 ENCOUNTER — APPOINTMENT (OUTPATIENT)
Dept: MEDICAL GROUP | Facility: MEDICAL CENTER | Age: 68
End: 2023-02-23
Payer: MEDICARE

## 2023-05-10 ENCOUNTER — HOSPITAL ENCOUNTER (OUTPATIENT)
Dept: RADIOLOGY | Facility: MEDICAL CENTER | Age: 68
End: 2023-05-10
Attending: NURSE PRACTITIONER
Payer: MEDICARE

## 2023-05-10 DIAGNOSIS — Z12.31 ENCOUNTER FOR MAMMOGRAM TO ESTABLISH BASELINE MAMMOGRAM: ICD-10-CM

## 2023-05-10 DIAGNOSIS — M85.89 OSTEOPENIA OF MULTIPLE SITES: ICD-10-CM

## 2023-05-10 PROCEDURE — 77063 BREAST TOMOSYNTHESIS BI: CPT

## 2023-05-10 PROCEDURE — 77080 DXA BONE DENSITY AXIAL: CPT

## 2023-05-31 ENCOUNTER — TELEPHONE (OUTPATIENT)
Dept: SLEEP MEDICINE | Facility: MEDICAL CENTER | Age: 68
End: 2023-05-31
Payer: MEDICARE

## 2023-05-31 NOTE — TELEPHONE ENCOUNTER
PATIENT BROUGHT IN NEW PAP FOR PRESSURES TO BE CHANGED .     PAP WAS SET AT AUTOCPAP 8-15 AND PRESSURES WERE CHANGED TO CPAP 11 AS ORDERED AND STATED ON PREVIOUS OFFICE VISIT WITH MARCY KELLER    LAST SEEN 12/21/2022

## 2023-07-24 ENCOUNTER — OFFICE VISIT (OUTPATIENT)
Dept: MEDICAL GROUP | Facility: MEDICAL CENTER | Age: 68
End: 2023-07-24
Payer: MEDICARE

## 2023-07-24 VITALS
WEIGHT: 194 LBS | BODY MASS INDEX: 33.12 KG/M2 | HEART RATE: 96 BPM | TEMPERATURE: 97 F | DIASTOLIC BLOOD PRESSURE: 82 MMHG | SYSTOLIC BLOOD PRESSURE: 124 MMHG | OXYGEN SATURATION: 96 % | HEIGHT: 64 IN

## 2023-07-24 DIAGNOSIS — N39.41 URGE INCONTINENCE OF URINE: ICD-10-CM

## 2023-07-24 DIAGNOSIS — Z23 NEED FOR VACCINATION: ICD-10-CM

## 2023-07-24 PROBLEM — N39.46 MIXED STRESS AND URGE URINARY INCONTINENCE: Status: ACTIVE | Noted: 2023-07-24

## 2023-07-24 LAB
APPEARANCE UR: CLEAR
BILIRUB UR STRIP-MCNC: NEGATIVE MG/DL
COLOR UR AUTO: YELLOW
GLUCOSE UR STRIP.AUTO-MCNC: NEGATIVE MG/DL
KETONES UR STRIP.AUTO-MCNC: NORMAL MG/DL
LEUKOCYTE ESTERASE UR QL STRIP.AUTO: NORMAL
NITRITE UR QL STRIP.AUTO: NEGATIVE
PH UR STRIP.AUTO: 6 [PH] (ref 5–8)
PROT UR QL STRIP: NEGATIVE MG/DL
RBC UR QL AUTO: NEGATIVE
SP GR UR STRIP.AUTO: 1.02
UROBILINOGEN UR STRIP-MCNC: 0.2 MG/DL

## 2023-07-24 PROCEDURE — 81002 URINALYSIS NONAUTO W/O SCOPE: CPT | Performed by: NURSE PRACTITIONER

## 2023-07-24 PROCEDURE — 3074F SYST BP LT 130 MM HG: CPT | Performed by: NURSE PRACTITIONER

## 2023-07-24 PROCEDURE — 99213 OFFICE O/P EST LOW 20 MIN: CPT | Mod: 25 | Performed by: NURSE PRACTITIONER

## 2023-07-24 PROCEDURE — 3079F DIAST BP 80-89 MM HG: CPT | Performed by: NURSE PRACTITIONER

## 2023-07-24 RX ORDER — OXYBUTYNIN CHLORIDE 5 MG/1
5 TABLET, EXTENDED RELEASE ORAL DAILY
Qty: 30 TABLET | Refills: 2 | Status: SHIPPED | OUTPATIENT
Start: 2023-07-24 | End: 2023-10-04 | Stop reason: SDUPTHER

## 2023-07-24 ASSESSMENT — FIBROSIS 4 INDEX: FIB4 SCORE: 1.25

## 2023-07-24 NOTE — ASSESSMENT & PLAN NOTE
Ongoing for the past year and hald. Worsened in the past 6 months. As soon as she feels like she needs to urinate she will start to urinate. No painful urination, blood in urine. Feels like she is fully emptying bladder.

## 2023-08-01 ENCOUNTER — RESEARCH ENCOUNTER (OUTPATIENT)
Dept: RESEARCH | Facility: WORKSITE | Age: 68
End: 2023-08-01
Payer: MEDICARE

## 2023-08-01 NOTE — RESEARCH NOTE
Virtual appointment completed to enroll patient into [facility] [place] [facility]. Consent forms signed and onsite collection has been scheduled.

## 2023-08-02 ENCOUNTER — RESEARCH ENCOUNTER (OUTPATIENT)
Dept: RESEARCH | Facility: WORKSITE | Age: 68
End: 2023-08-02
Payer: MEDICARE

## 2023-08-02 DIAGNOSIS — Z00.6 RESEARCH STUDY PATIENT: ICD-10-CM

## 2023-08-02 NOTE — RESEARCH NOTE
Confirmed with the participant which designated provider they would like study results shared with. Patient will have an opportunity to share the results with any providers of their choosing in the future by printing the report through their [employer] portal or printing the results from [employer].

## 2023-08-09 DIAGNOSIS — Z00.6 RESEARCH STUDY PATIENT: ICD-10-CM

## 2023-08-16 ENCOUNTER — HOSPITAL ENCOUNTER (OUTPATIENT)
Dept: LAB | Facility: MEDICAL CENTER | Age: 68
End: 2023-08-16
Attending: NURSE PRACTITIONER
Payer: MEDICARE

## 2023-08-16 DIAGNOSIS — Z00.6 RESEARCH STUDY PATIENT: ICD-10-CM

## 2023-08-16 PROCEDURE — 36415 COLL VENOUS BLD VENIPUNCTURE: CPT

## 2023-08-18 LAB
ELF SCORE: 9.14 PPM (ref 9.8–11.3)
RELATIVE RISK: NORMAL
RISK GROUP: NORMAL
RISK: 3.3 %

## 2023-08-21 ENCOUNTER — HOSPITAL ENCOUNTER (OUTPATIENT)
Dept: LAB | Facility: MEDICAL CENTER | Age: 68
End: 2023-08-21
Attending: PODIATRIST
Payer: MEDICARE

## 2023-08-21 LAB
BASOPHILS # BLD AUTO: 1.1 % (ref 0–1.8)
BASOPHILS # BLD: 0.06 K/UL (ref 0–0.12)
CRP SERPL HS-MCNC: <0.3 MG/DL (ref 0–0.75)
EOSINOPHIL # BLD AUTO: 0.34 K/UL (ref 0–0.51)
EOSINOPHIL NFR BLD: 6 % (ref 0–6.9)
ERYTHROCYTE [DISTWIDTH] IN BLOOD BY AUTOMATED COUNT: 47.8 FL (ref 35.9–50)
ERYTHROCYTE [SEDIMENTATION RATE] IN BLOOD BY WESTERGREN METHOD: 7 MM/HOUR (ref 0–25)
HCT VFR BLD AUTO: 41.8 % (ref 37–47)
HGB BLD-MCNC: 12.6 G/DL (ref 12–16)
IMM GRANULOCYTES # BLD AUTO: 0.01 K/UL (ref 0–0.11)
IMM GRANULOCYTES NFR BLD AUTO: 0.2 % (ref 0–0.9)
LYMPHOCYTES # BLD AUTO: 2 K/UL (ref 1–4.8)
LYMPHOCYTES NFR BLD: 35.5 % (ref 22–41)
MCH RBC QN AUTO: 27.5 PG (ref 27–33)
MCHC RBC AUTO-ENTMCNC: 30.1 G/DL (ref 32.2–35.5)
MCV RBC AUTO: 91.3 FL (ref 81.4–97.8)
MONOCYTES # BLD AUTO: 0.33 K/UL (ref 0–0.85)
MONOCYTES NFR BLD AUTO: 5.9 % (ref 0–13.4)
NEUTROPHILS # BLD AUTO: 2.89 K/UL (ref 1.82–7.42)
NEUTROPHILS NFR BLD: 51.3 % (ref 44–72)
NRBC # BLD AUTO: 0 K/UL
NRBC BLD-RTO: 0 /100 WBC (ref 0–0.2)
PLATELET # BLD AUTO: 281 K/UL (ref 164–446)
PMV BLD AUTO: 11 FL (ref 9–12.9)
RBC # BLD AUTO: 4.58 M/UL (ref 4.2–5.4)
URATE SERPL-MCNC: 5 MG/DL (ref 1.9–8.2)
WBC # BLD AUTO: 5.6 K/UL (ref 4.8–10.8)

## 2023-08-21 PROCEDURE — 86038 ANTINUCLEAR ANTIBODIES: CPT

## 2023-08-21 PROCEDURE — 85025 COMPLETE CBC W/AUTO DIFF WBC: CPT

## 2023-08-21 PROCEDURE — 36415 COLL VENOUS BLD VENIPUNCTURE: CPT

## 2023-08-21 PROCEDURE — 86431 RHEUMATOID FACTOR QUANT: CPT

## 2023-08-21 PROCEDURE — 86140 C-REACTIVE PROTEIN: CPT

## 2023-08-21 PROCEDURE — 86039 ANTINUCLEAR ANTIBODIES (ANA): CPT

## 2023-08-21 PROCEDURE — 85652 RBC SED RATE AUTOMATED: CPT

## 2023-08-21 PROCEDURE — 84550 ASSAY OF BLOOD/URIC ACID: CPT

## 2023-08-22 LAB — RHEUMATOID FACT SER IA-ACNC: <10 IU/ML (ref 0–14)

## 2023-08-23 ENCOUNTER — OFFICE VISIT (OUTPATIENT)
Dept: MEDICAL GROUP | Facility: MEDICAL CENTER | Age: 68
End: 2023-08-23
Payer: MEDICARE

## 2023-08-23 VITALS
WEIGHT: 197.1 LBS | TEMPERATURE: 97.7 F | SYSTOLIC BLOOD PRESSURE: 120 MMHG | HEIGHT: 64 IN | DIASTOLIC BLOOD PRESSURE: 86 MMHG | OXYGEN SATURATION: 96 % | BODY MASS INDEX: 33.65 KG/M2 | HEART RATE: 60 BPM

## 2023-08-23 DIAGNOSIS — M79.672 LEFT FOOT PAIN: ICD-10-CM

## 2023-08-23 DIAGNOSIS — Z23 NEED FOR VACCINATION: ICD-10-CM

## 2023-08-23 DIAGNOSIS — R05.2 SUBACUTE COUGH: ICD-10-CM

## 2023-08-23 LAB — NUCLEAR IGG SER QL IA: DETECTED

## 2023-08-23 PROCEDURE — G0009 ADMIN PNEUMOCOCCAL VACCINE: HCPCS | Performed by: NURSE PRACTITIONER

## 2023-08-23 PROCEDURE — 99213 OFFICE O/P EST LOW 20 MIN: CPT | Mod: 25 | Performed by: NURSE PRACTITIONER

## 2023-08-23 PROCEDURE — 3079F DIAST BP 80-89 MM HG: CPT | Performed by: NURSE PRACTITIONER

## 2023-08-23 PROCEDURE — 3074F SYST BP LT 130 MM HG: CPT | Performed by: NURSE PRACTITIONER

## 2023-08-23 PROCEDURE — 90677 PCV20 VACCINE IM: CPT | Performed by: NURSE PRACTITIONER

## 2023-08-23 RX ORDER — MELOXICAM 15 MG/1
15 TABLET ORAL
COMMUNITY
Start: 2023-08-21 | End: 2023-10-26

## 2023-08-23 RX ORDER — PREDNISONE 20 MG/1
40 TABLET ORAL DAILY
Qty: 10 TABLET | Refills: 0 | Status: SHIPPED | OUTPATIENT
Start: 2023-08-23 | End: 2023-08-28

## 2023-08-23 ASSESSMENT — FIBROSIS 4 INDEX: FIB4 SCORE: 1.21

## 2023-08-23 NOTE — ASSESSMENT & PLAN NOTE
Ongoing for a week, currently seeing podiatry, Dr. Mathew, for a neuroma. Currently has swelling, pain of top of left foot. Recent arthritis/inflammatory labs unremarkable. Uric acid normal, but is currently having flair of pain, no history of gout. Xray with podiatry was unremarkable.     Took naproxen, meloxicam, with no improvement.

## 2023-08-24 NOTE — PROGRESS NOTES
"Subjective:   Mey Araujo is a 68 y.o. female here today for foot pain    Left foot pain  Ongoing for a week, currently seeing podiatry, Dr. Mathew, for a neuroma. Currently has swelling, pain of top of left foot. Recent arthritis/inflammatory labs unremarkable. Uric acid normal, but is currently having flair of pain, no history of gout. Xray with podiatry was unremarkable.     Took naproxen, meloxicam, with no improvement.     Subacute cough  Ongoing for a few months, will get one cough that starts with a tickle in her throat     Current medicines (including changes today)  Current Outpatient Medications   Medication Sig Dispense Refill    predniSONE (DELTASONE) 20 MG Tab Take 2 Tablets by mouth every day for 5 days. 10 Tablet 0    meloxicam (MOBIC) 15 MG tablet Take 15 mg by mouth every day.      oxybutynin SR (DITROPAN-XL) 5 MG TABLET SR 24 HR Take 1 Tablet by mouth every day. 30 Tablet 2    B Complex-Biotin-FA (SUPER B-100 PO)       sertraline (ZOLOFT) 50 MG Tab Take 1 Tablet by mouth every day. 90 Tablet 3    Multiple Vitamins-Minerals (CENTRUM SILVER 50+WOMEN PO)       Cholecalciferol (D3 ADULT PO) Take  by mouth.       No current facility-administered medications for this visit.     She  has a past medical history of Allergic rhinitis, Kidney stone, and Osteopenia (11/8/2019).    ROS   No chest pain, no shortness of breath, no abdominal pain  Positive ROS as per HPI.  All other systems reviewed and are negative.     Objective:     /86   Pulse 60   Temp 36.5 °C (97.7 °F) (Temporal)   Ht 1.626 m (5' 4\")   Wt 89.4 kg (197 lb 1.6 oz)   SpO2 96%  Body mass index is 33.83 kg/m².     Physical Exam:  Constitutional: Alert, no distress.  Skin: Warm, dry, good turgor, no rashes in visible areas.  Eye: Equal, round and reactive, conjunctiva clear, lids normal.  ENMT: Lips without lesions, good dentition  Respiratory: Unlabored respiratory effort  Psych: Alert and oriented x3, normal affect and " mood.  MSK: pain with palpation of left foot      Assessment and Plan:   The following treatment plan was discussed    1. Left foot pain  Unstable  OA, vs stress fracture vs gout  NSAIDS not effective, xray unremarkable  Start prednisone Burst tomorrow morning  Repeat Uric acid 2 weeks after pain has resolved  - predniSONE (DELTASONE) 20 MG Tab; Take 2 Tablets by mouth every day for 5 days.  Dispense: 10 Tablet; Refill: 0  - URIC ACID; Future    2. Subacute cough  Unstable, likely allergies  Start OTC antihistamine    3. Need for vaccination  - Pneumococcal Conjugate Vaccine 20-Valent (19 yrs+)      Followup: Return if symptoms worsen or fail to improve.    The MA is performing the above orders under the direction of Dr. Machado    Please note that this dictation was created using voice recognition software. I have made every reasonable attempt to correct obvious errors, but I expect that there are errors of grammar and possibly content that I did not discover before finalizing the note.

## 2023-08-25 LAB
ANA INTERPRETIVE COMMENT Q5143: ABNORMAL
ANA PATTERN Q5144: ABNORMAL
ANA TITER Q5145: ABNORMAL
ANTINUCLEAR ANTIBODY (ANA), HEP-2, IGG Q5142: DETECTED

## 2023-09-04 DIAGNOSIS — M79.672 LEFT FOOT PAIN: ICD-10-CM

## 2023-09-04 LAB
APOB+LDLR+PCSK9 GENE MUT ANL BLD/T: NOT DETECTED
BRCA1+BRCA2 DEL+DUP + FULL MUT ANL BLD/T: NOT DETECTED
MLH1+MSH2+MSH6+PMS2 GN DEL+DUP+FUL M: NOT DETECTED

## 2023-09-05 PROCEDURE — 90677 PCV20 VACCINE IM: CPT | Performed by: NURSE PRACTITIONER

## 2023-09-05 PROCEDURE — G0009 ADMIN PNEUMOCOCCAL VACCINE: HCPCS | Performed by: NURSE PRACTITIONER

## 2023-09-05 RX ORDER — PREDNISONE 20 MG/1
TABLET ORAL
Qty: 10 TABLET | Refills: 0 | OUTPATIENT
Start: 2023-09-05

## 2023-09-05 NOTE — TELEPHONE ENCOUNTER
Received request via: Pharmacy    Was the patient seen in the last year in this department? Yes    Does the patient have an active prescription (recently filled or refills available) for medication(s) requested? yes    Does the patient have [facility] and need 100 day supply (blood pressure, diabetes and cholesterol meds only)? Patient does not have [facility]   Requested Prescriptions     Pending Prescriptions Disp Refills    predniSONE (DELTASONE) 20 MG Tab [Pharmacy Med Name: predniSONE 20 MG TABLET] 10 Tablet 0     Sig: TAKE 2 TABLETS BY MOUTH DAILY FOR 5 DAYS

## 2023-09-05 NOTE — PROGRESS NOTES
"Subjective:   Mey Araujo is a 68 y.o. female here today for vaccination, incontinence    Urge incontinence of urine  Ongoing for the past year and hald. Worsened in the past 6 months. As soon as she feels like she needs to urinate she will start to urinate. No painful urination, blood in urine. Feels like she is fully emptying bladder.      Current medicines (including changes today)  Current Outpatient Medications   Medication Sig Dispense Refill    oxybutynin SR (DITROPAN-XL) 5 MG TABLET SR 24 HR Take 1 Tablet by mouth every day. 30 Tablet 2    meloxicam (MOBIC) 15 MG tablet Take 15 mg by mouth every day.      B Complex-Biotin-FA (SUPER B-100 PO)       sertraline (ZOLOFT) 50 MG Tab Take 1 Tablet by mouth every day. 90 Tablet 3    Multiple Vitamins-Minerals (CENTRUM SILVER 50+WOMEN PO)       Cholecalciferol (D3 ADULT PO) Take  by mouth.       No current facility-administered medications for this visit.     She  has a past medical history of Allergic rhinitis, Kidney stone, and Osteopenia (11/8/2019).    ROS   No chest pain, no shortness of breath, no abdominal pain  Positive ROS as per HPI.  All other systems reviewed and are negative.     Objective:     /82   Pulse 96   Temp 36.1 °C (97 °F) (Temporal)   Ht 1.626 m (5' 4\")   Wt 88 kg (194 lb)   SpO2 96%  Body mass index is 33.3 kg/m².     Physical Exam:  Constitutional: Alert, no distress.  Skin: Warm, dry, good turgor, no rashes in visible areas.  Eye: Equal, round and reactive, conjunctiva clear, lids normal.  ENMT: Lips without lesions, good dentition  Respiratory: Unlabored respiratory effort  Abdomen: Soft, non-tender, no masses, no hepatosplenomegaly.  Psych: Alert and oriented x3, normal affect and mood.        Assessment and Plan:   The following treatment plan was discussed    1. Urge incontinence of urine  Unstable  UA unremarkable, no s/s infection  Trial oxybutynin nightly  - POCT Urinalysis  - oxybutynin SR (DITROPAN-XL) 5 MG " TABLET SR 24 HR; Take 1 Tablet by mouth every day.  Dispense: 30 Tablet; Refill: 2    2. Need for vaccination  - Pneumococcal Conjugate Vaccine 20-Valent (19 yrs+)      Followup: Return if symptoms worsen or fail to improve.    The MA is performing the above orders under the direction of Dr. Machado    Please note that this dictation was created using voice recognition software. I have made every reasonable attempt to correct obvious errors, but I expect that there are errors of grammar and possibly content that I did not discover before finalizing the note.

## 2023-09-21 ENCOUNTER — APPOINTMENT (RX ONLY)
Dept: URBAN - METROPOLITAN AREA CLINIC 4 | Facility: CLINIC | Age: 68
Setting detail: DERMATOLOGY
End: 2023-09-21

## 2023-09-21 DIAGNOSIS — L57.0 ACTINIC KERATOSIS: ICD-10-CM

## 2023-09-21 DIAGNOSIS — L82.1 OTHER SEBORRHEIC KERATOSIS: ICD-10-CM

## 2023-09-21 DIAGNOSIS — L23.9 ALLERGIC CONTACT DERMATITIS, UNSPECIFIED CAUSE: ICD-10-CM

## 2023-09-21 PROCEDURE — 99203 OFFICE O/P NEW LOW 30 MIN: CPT | Mod: 25

## 2023-09-21 PROCEDURE — 17000 DESTRUCT PREMALG LESION: CPT

## 2023-09-21 PROCEDURE — ? LIQUID NITROGEN

## 2023-09-21 PROCEDURE — ? COUNSELING

## 2023-09-21 PROCEDURE — 17003 DESTRUCT PREMALG LES 2-14: CPT

## 2023-09-21 PROCEDURE — ? PRESCRIPTION

## 2023-09-21 RX ORDER — TRIAMCINOLONE ACETONIDE 1 MG/G
1 CREAM TOPICAL
Qty: 30 | Refills: 3 | Status: ERX | COMMUNITY
Start: 2023-09-21

## 2023-09-21 RX ORDER — FLUOROURACIL 5 MG/G
1 CREAM TOPICAL
Qty: 40 | Refills: 3 | Status: ERX | COMMUNITY
Start: 2023-09-21

## 2023-09-21 RX ADMIN — TRIAMCINOLONE ACETONIDE 1: 1 CREAM TOPICAL at 00:00

## 2023-09-21 RX ADMIN — FLUOROURACIL 1: 5 CREAM TOPICAL at 00:00

## 2023-09-21 ASSESSMENT — LOCATION DETAILED DESCRIPTION DERM
LOCATION DETAILED: LEFT DISTAL PRETIBIAL REGION
LOCATION DETAILED: SUBXIPHOID
LOCATION DETAILED: RIGHT MEDIAL FOREHEAD
LOCATION DETAILED: NASAL DORSUM
LOCATION DETAILED: LEFT INFERIOR FOREHEAD
LOCATION DETAILED: UPPER STERNUM

## 2023-09-21 ASSESSMENT — LOCATION SIMPLE DESCRIPTION DERM
LOCATION SIMPLE: NOSE
LOCATION SIMPLE: RIGHT FOREHEAD
LOCATION SIMPLE: LEFT FOREHEAD
LOCATION SIMPLE: LEFT PRETIBIAL REGION
LOCATION SIMPLE: CHEST
LOCATION SIMPLE: ABDOMEN

## 2023-09-21 ASSESSMENT — LOCATION ZONE DERM
LOCATION ZONE: FACE
LOCATION ZONE: NOSE
LOCATION ZONE: TRUNK
LOCATION ZONE: LEG

## 2023-09-21 NOTE — PROCEDURE: LIQUID NITROGEN
Post-Care Instructions: I reviewed with the patient in detail post-care instructions. Patient is to wear sunprotection, and avoid picking at any of the treated lesions. Pt may apply Vaseline to crusted or scabbing areas.
Duration Of Freeze Thaw-Cycle (Seconds): 2
Number Of Freeze-Thaw Cycles: 2 freeze-thaw cycles
Show Applicator Variable?: Yes
Render Post-Care Instructions In Note?: no
Detail Level: Simple
Consent: The patient's consent was obtained including but not limited to risks of crusting, scabbing, blistering, scarring, darker or lighter pigmentary change, recurrence, incomplete removal and infection.

## 2023-10-02 ENCOUNTER — NON-PROVIDER VISIT (OUTPATIENT)
Dept: MEDICAL GROUP | Facility: MEDICAL CENTER | Age: 68
End: 2023-10-02
Payer: MEDICARE

## 2023-10-02 DIAGNOSIS — Z23 NEED FOR VACCINATION: ICD-10-CM

## 2023-10-02 PROCEDURE — G0008 ADMIN INFLUENZA VIRUS VAC: HCPCS | Performed by: NURSE PRACTITIONER

## 2023-10-02 PROCEDURE — 90662 IIV NO PRSV INCREASED AG IM: CPT | Performed by: NURSE PRACTITIONER

## 2023-10-02 NOTE — PROGRESS NOTES
"Mey Araujo is a 68 y.o. female here for a non-provider visit for:   FLU    Reason for immunization: Annual Flu Vaccine  Immunization records indicate need for vaccine: yes  Minimum interval has been met for this vaccine: No  ABN completed: Yes    VIS Dated  08/06/2021 was given to patient: Yes  All IAC Questionnaire questions were answered \"No.\"    Patient tolerated injection and no adverse effects were observed or reported: No    Pt scheduled for next dose in series: No  "

## 2023-10-04 DIAGNOSIS — N39.41 URGE INCONTINENCE OF URINE: ICD-10-CM

## 2023-10-04 NOTE — TELEPHONE ENCOUNTER
Received request via: Patient    Was the patient seen in the last year in this department? Yes    Does the patient have an active prescription (recently filled or refills available) for medication(s) requested? No    Does the patient have [facility] and need 100 day supply (blood pressure, diabetes and cholesterol meds only)? Yes

## 2023-10-05 RX ORDER — OXYBUTYNIN CHLORIDE 5 MG/1
10 TABLET, EXTENDED RELEASE ORAL DAILY
Qty: 60 TABLET | Refills: 1 | Status: SHIPPED | OUTPATIENT
Start: 2023-10-05 | End: 2024-01-02

## 2023-10-11 NOTE — PROGRESS NOTES
CC: Annual visit for DONN on CPAP 11 CWP        HPI:  Mey Araujo is a 68 y.o.female  kindly referred by FRANCY Gunn and last seen by ARIANNA Mendoza on 12/21/2022 when her compliance was excellent and her AHI normalized at 1.7 on CPAP at 11 CWP.  She was using a ResMed CPAP machine.    Today, 10/16/2023, her 30-day compliance is 80% for 8 hours and 24 minutes.  On CPAP at 11 cm water her average residual estimated apnea-hypopnea index is a somewhat elevated 9.1.  The central index is 8.1.    Her 5/31/2020 PSG showed an overall AHI of 59.3 with a bijal saturation of 73.0%.  During the treatment phase 51.3% of the events were scored as central apneas.  Nevertheless, the patient had a favorable response to CPAP at 11 CWP.    The patient reports improved symptoms using positive airway pressure.  Has experienced no significant problems with claustrophobia, nosebleeds, sore throat, dry eyes, mask fit, air leaks around the mask, pressure tolerance, excessive airway dryness, dry or runny nose, nasal congestion, facial irritation, aerophagia, or chest pain.     Past medical history significant for allergic rhinitis, osteopenia, dyslipidemia, obesity, dizziness, never smoker, RICHARD, depression, planter fasciitis, history of kidney stones, history of pneumonia, and never smoker.      Patient Active Problem List    Diagnosis Date Noted    Subacute cough 08/23/2023    Urge incontinence of urine 07/24/2023    Left foot pain 02/03/2023    BMI 31.0-31.9,adult 01/25/2023    Urinary incontinence 01/25/2023    Moderate episode of recurrent major depressive disorder (HCC) 09/30/2022    RICHARD (generalized anxiety disorder) 09/30/2022    BMI 29.0-29.9,adult 04/11/2022    Overweight with body mass index (BMI) 25.0-29.9 04/11/2022    Syncope 04/11/2022    Dizziness 10/28/2021    Pure hypercholesterolemia 02/22/2021    Plantar fasciitis 02/22/2021    Obesity (BMI 30-39.9) 02/22/2021    DONN (obstructive sleep apnea)  05/12/2020    Non-smoker 05/12/2020    Nocturnal hypoxia 12/06/2019    Vitamin D insufficiency 12/06/2019    Snoring 11/08/2019    Finger pain, left 11/08/2019    Swelling of finger joint of left hand 11/08/2019    Osteopenia of multiple sites 11/08/2019    Preventative health care 04/06/2016    History of kidney stones 04/06/2016    Diarrhea of presumed infectious origin 03/12/2016    Headache 03/10/2016    History of pneumonia 03/10/2016       Past Medical History:   Diagnosis Date    Allergic rhinitis     Kidney stone     Osteopenia 11/8/2019        Past Surgical History:   Procedure Laterality Date    NEPHROLITHOTOMY      SINUSCOPE      TONSILLECTOMY      TONSILLECTOMY AND ADENOIDECTOMY         Family History   Problem Relation Age of Onset    Cancer Father         prostate ca, skin cancer.     Lung Disease Father         pulmonary fibrosis    Other Sister         sleep apnea    Psychiatric Illness Sister         depression       Social History     Socioeconomic History    Marital status:      Spouse name: Not on file    Number of children: Not on file    Years of education: Not on file    Highest education level: Not on file   Occupational History    Not on file   Tobacco Use    Smoking status: Never    Smokeless tobacco: Never   Vaping Use    Vaping Use: Never used   Substance and Sexual Activity    Alcohol use: Yes     Comment: 2 glasses wine 3 d/wk    Drug use: No    Sexual activity: Not on file   Other Topics Concern    Not on file   Social History Narrative    Not on file     Social Determinants of Health     Financial Resource Strain: Not on file   Food Insecurity: Not on file   Transportation Needs: Not on file   Physical Activity: Not on file   Stress: Not on file   Social Connections: Not on file   Intimate Partner Violence: Not on file   Housing Stability: Not on file       Current Outpatient Medications   Medication Sig Dispense Refill    oxybutynin SR (DITROPAN-XL) 5 MG TABLET SR 24 HR Take 2  "Tablets by mouth every day. 60 Tablet 1    B Complex-Biotin-FA (SUPER B-100 PO)       Multiple Vitamins-Minerals (CENTRUM SILVER 50+WOMEN PO)       Cholecalciferol (D3 ADULT PO) Take  by mouth.      meloxicam (MOBIC) 15 MG tablet Take 15 mg by mouth every day. (Patient not taking: Reported on 10/16/2023)      sertraline (ZOLOFT) 50 MG Tab Take 1 Tablet by mouth every day. (Patient not taking: Reported on 10/16/2023) 90 Tablet 3     No current facility-administered medications for this visit.    \"CURRENT RX\"    ALLERGIES: Pcn [penicillins] and Hydrocodone    ROS    Constitutional: Denies fever, chills, sweats,  weight loss, fatigue  Cardiovascular: Denies chest pain, tightness, palpitations, swelling in legs/feet, fainting, difficulty breathing when lying down but gets better when sitting up.   Respiratory: Denies shortness of breath, cough, sputum, wheezing, painful breathing, coughing up blood.   Sleep: per HPI  Gastrointestinal: Denies  difficulty swallowing, nausea, abdominal pain, diarrhea, constipation, heartburn.  Musculoskeletal: Denies painful joints, sore muscles, back pain.        PHYSICAL EXAM  Obese    /84 (BP Location: Left arm, Patient Position: Sitting, BP Cuff Size: Adult)   Pulse 62   Resp 16   Ht 1.626 m (5' 4\")   Wt 82.1 kg (181 lb)   SpO2 98%   BMI 31.07 kg/m²   Appearance: Well-nourished, well-developed, no acute distress  Eyes:  PERRLA, EOMI  ENMT: without change  Neck: Supple, trachea midline, no masses  Respiratory effort:  No intercostal retractions or use of accessory muscles  Lung auscultation:  No wheezes rhonchi rubs or rales  Cardiac: No murmurs, rubs, or gallops; regular rhythm, normal rate; no edema  Abdomen:  No tenderness, no organomegaly.  Musculoskeletal:  Grossly normal; gait and station normal; digits and nails normal  Skin:  No rashes, petechiae, cyanosis  Neurologic: without focal signs; oriented to person, time, place, and purpose; judgement intact  Psychiatric:  " No depression, anxiety, agitation      Medical Decision Making       The medical record was reviewed in its entirety including the referral notes, records from primary care, consultants notes, hospital records, lab, imaging, microbiology, immunology, and immunizations.  Care gaps identified and reviewed with the patient.    Diagnostic and titration nocturnal polysomnogram's, home sleep apnea tests, continuous nocturnal oximetry results, multiple sleep latency tests, and compliance reports reviewed.  1. DONN (obstructive sleep apnea)  - DME Mask and Supplies      PLAN:   Has been advised to continue the current PAP  , clean equipment frequently, and get new mask and supplies as allowed by insurance and DME. Advised about potential problems including nasal obstruction/stuffiness, mask leak or discomfort , frequent awakenings, chill or dryness of the upper airway, noise, inconvenience, and lack of benefit. Recommend an earlier appointment, if significant treatment barriers develop.    The risks of untreated sleep apnea were discussed with the patient at length. Patients with DONN are at increased risk of cardiovascular disease including systemic arterial hypertension, pulmonary arterial hypertension (transient or fixed), TIA, and an elevated risk of stroke, heart attack, sudden death, or arrhythmia between the hours of 11 PM and 6 AM. DONN patients have an increased risk of motor vehicle accidents, type 2 diabetes, GERD, repetitive mechanical trauma to pharyngeal muscles with inflammation and denervation, frequent EEG arousals leading to nonrestorative sleep, excessive daytime sleepiness, fatigue, depression, poor pain control, irritability, and lower quality of life.  The patient was advised to avoid driving a motor vehicle when drowsy.    Positive airway pressure will favorably impact many of the adverse conditions and effects provoked by DONN.    Have advised the patient to follow up with the appropriate healthcare  practitioners for all other medical problems and issues.    Return in about 1 year (around 10/16/2024).    Total time 30 minutes

## 2023-10-16 ENCOUNTER — OFFICE VISIT (OUTPATIENT)
Dept: SLEEP MEDICINE | Facility: MEDICAL CENTER | Age: 68
End: 2023-10-16
Attending: INTERNAL MEDICINE
Payer: MEDICARE

## 2023-10-16 VITALS
HEIGHT: 64 IN | BODY MASS INDEX: 30.9 KG/M2 | DIASTOLIC BLOOD PRESSURE: 84 MMHG | HEART RATE: 62 BPM | WEIGHT: 181 LBS | RESPIRATION RATE: 16 BRPM | SYSTOLIC BLOOD PRESSURE: 124 MMHG | OXYGEN SATURATION: 98 %

## 2023-10-16 DIAGNOSIS — G47.33 OSA (OBSTRUCTIVE SLEEP APNEA): ICD-10-CM

## 2023-10-16 DIAGNOSIS — G47.39 TREATMENT-EMERGENT CENTRAL SLEEP APNEA: ICD-10-CM

## 2023-10-16 PROCEDURE — 3079F DIAST BP 80-89 MM HG: CPT | Performed by: INTERNAL MEDICINE

## 2023-10-16 PROCEDURE — 3074F SYST BP LT 130 MM HG: CPT | Performed by: INTERNAL MEDICINE

## 2023-10-16 PROCEDURE — 99213 OFFICE O/P EST LOW 20 MIN: CPT | Performed by: INTERNAL MEDICINE

## 2023-10-16 PROCEDURE — 99214 OFFICE O/P EST MOD 30 MIN: CPT | Performed by: INTERNAL MEDICINE

## 2023-10-16 ASSESSMENT — FIBROSIS 4 INDEX: FIB4 SCORE: 1.21

## 2023-10-24 PROBLEM — M79.672 PAIN OF LEFT FOOT: Status: ACTIVE | Noted: 2023-10-24

## 2024-01-01 DIAGNOSIS — N39.41 URGE INCONTINENCE OF URINE: ICD-10-CM

## 2024-01-02 RX ORDER — OXYBUTYNIN CHLORIDE 5 MG/1
10 TABLET, EXTENDED RELEASE ORAL DAILY
Qty: 60 TABLET | Refills: 5 | Status: SHIPPED | OUTPATIENT
Start: 2024-01-02 | End: 2024-01-23 | Stop reason: SDUPTHER

## 2024-01-09 ENCOUNTER — TELEPHONE (OUTPATIENT)
Dept: HEALTH INFORMATION MANAGEMENT | Facility: OTHER | Age: 69
End: 2024-01-09
Payer: MEDICARE

## 2024-01-23 DIAGNOSIS — N39.41 URGE INCONTINENCE OF URINE: ICD-10-CM

## 2024-01-23 NOTE — TELEPHONE ENCOUNTER
Received request via: {REFILL PATIENT/PHARMACY:[id number]}    Was the patient seen in the last year in this department? {YES / NO:38282}    Does the patient have an active prescription (recently filled or refills available) for medication(s) requested? {Yes / No:61142}    Pharmacy Name: ***    Does the patient have group home Plus and need 100 day supply (blood pressure, diabetes and cholesterol meds only)? {YES/NO/NA:19871}

## 2024-01-25 ENCOUNTER — APPOINTMENT (OUTPATIENT)
Dept: MEDICAL GROUP | Facility: MEDICAL CENTER | Age: 69
End: 2024-01-25
Payer: MEDICARE

## 2024-02-05 RX ORDER — OXYBUTYNIN CHLORIDE 5 MG/1
10 TABLET, EXTENDED RELEASE ORAL DAILY
Qty: 60 TABLET | Refills: 5 | Status: SHIPPED | OUTPATIENT
Start: 2024-02-05 | End: 2024-02-23 | Stop reason: SDUPTHER

## 2024-02-05 NOTE — TELEPHONE ENCOUNTER
Received request via: Pharmacy    Was the patient seen in the last year in this department? Yes    Does the patient have an active prescription (recently filled or refills available) for medication(s) requested? No    Pharmacy Name:     Does the patient have group home Plus and need 100 day supply (blood pressure, diabetes and cholesterol meds only)? Yes, quantity updated to 100 days

## 2024-02-22 ENCOUNTER — APPOINTMENT (OUTPATIENT)
Dept: MEDICAL GROUP | Facility: MEDICAL CENTER | Age: 69
End: 2024-02-22
Payer: MEDICARE

## 2024-02-23 ENCOUNTER — HOSPITAL ENCOUNTER (OUTPATIENT)
Facility: MEDICAL CENTER | Age: 69
End: 2024-02-23
Attending: NURSE PRACTITIONER
Payer: MEDICARE

## 2024-02-23 ENCOUNTER — OFFICE VISIT (OUTPATIENT)
Dept: MEDICAL GROUP | Facility: MEDICAL CENTER | Age: 69
End: 2024-02-23
Payer: MEDICARE

## 2024-02-23 VITALS
DIASTOLIC BLOOD PRESSURE: 78 MMHG | WEIGHT: 166 LBS | OXYGEN SATURATION: 96 % | HEART RATE: 55 BPM | SYSTOLIC BLOOD PRESSURE: 134 MMHG | HEIGHT: 65 IN | BODY MASS INDEX: 27.66 KG/M2 | RESPIRATION RATE: 16 BRPM | TEMPERATURE: 97.7 F

## 2024-02-23 DIAGNOSIS — E55.9 VITAMIN D INSUFFICIENCY: ICD-10-CM

## 2024-02-23 DIAGNOSIS — Z86.19 HISTORY OF CANDIDIASIS OF VAGINA: ICD-10-CM

## 2024-02-23 DIAGNOSIS — M85.89 OSTEOPENIA OF MULTIPLE SITES: ICD-10-CM

## 2024-02-23 DIAGNOSIS — R39.15 URINARY URGENCY: ICD-10-CM

## 2024-02-23 DIAGNOSIS — Z13.0 ENCOUNTER FOR SCREENING FOR HEMATOLOGIC DISORDER: ICD-10-CM

## 2024-02-23 DIAGNOSIS — N39.41 URGE INCONTINENCE OF URINE: ICD-10-CM

## 2024-02-23 DIAGNOSIS — E78.00 PURE HYPERCHOLESTEROLEMIA: ICD-10-CM

## 2024-02-23 DIAGNOSIS — Z13.29 SCREENING FOR THYROID DISORDER: ICD-10-CM

## 2024-02-23 DIAGNOSIS — F33.1 MODERATE EPISODE OF RECURRENT MAJOR DEPRESSIVE DISORDER (HCC): ICD-10-CM

## 2024-02-23 DIAGNOSIS — Z13.228 ENCOUNTER FOR SCREENING FOR METABOLIC DISORDER: ICD-10-CM

## 2024-02-23 DIAGNOSIS — E66.3 OVERWEIGHT WITH BODY MASS INDEX (BMI) 25.0-29.9: ICD-10-CM

## 2024-02-23 LAB
APPEARANCE UR: CLEAR
BILIRUB UR STRIP-MCNC: NORMAL MG/DL
COLOR UR AUTO: YELLOW
GLUCOSE UR STRIP.AUTO-MCNC: NORMAL MG/DL
KETONES UR STRIP.AUTO-MCNC: NORMAL MG/DL
LEUKOCYTE ESTERASE UR QL STRIP.AUTO: NORMAL
NITRITE UR QL STRIP.AUTO: NORMAL
PH UR STRIP.AUTO: 7 [PH] (ref 5–8)
PROT UR QL STRIP: NORMAL MG/DL
RBC UR QL AUTO: NORMAL
SP GR UR STRIP.AUTO: 1.02
UROBILINOGEN UR STRIP-MCNC: 0.2 MG/DL

## 2024-02-23 PROCEDURE — 99214 OFFICE O/P EST MOD 30 MIN: CPT | Performed by: NURSE PRACTITIONER

## 2024-02-23 PROCEDURE — 87077 CULTURE AEROBIC IDENTIFY: CPT

## 2024-02-23 PROCEDURE — 3075F SYST BP GE 130 - 139MM HG: CPT | Performed by: NURSE PRACTITIONER

## 2024-02-23 PROCEDURE — 87086 URINE CULTURE/COLONY COUNT: CPT

## 2024-02-23 PROCEDURE — 3078F DIAST BP <80 MM HG: CPT | Performed by: NURSE PRACTITIONER

## 2024-02-23 PROCEDURE — 87186 SC STD MICRODIL/AGAR DIL: CPT

## 2024-02-23 PROCEDURE — 81002 URINALYSIS NONAUTO W/O SCOPE: CPT | Performed by: NURSE PRACTITIONER

## 2024-02-23 RX ORDER — FLUCONAZOLE 150 MG/1
150 TABLET ORAL
Qty: 3 TABLET | Refills: 0 | Status: SHIPPED | OUTPATIENT
Start: 2024-02-23

## 2024-02-23 RX ORDER — OXYBUTYNIN CHLORIDE 10 MG/1
10 TABLET, EXTENDED RELEASE ORAL DAILY
Qty: 90 TABLET | Refills: 3 | Status: SHIPPED | OUTPATIENT
Start: 2024-02-23

## 2024-02-23 ASSESSMENT — FIBROSIS 4 INDEX: FIB4 SCORE: 1.21

## 2024-02-23 NOTE — PROGRESS NOTES
Subjective:     History of Present Illness  The patient presents for evaluation of multiple medical concerns.    She reports having some urinary urgency this week.Feels like when she urinates it doesn't feel normal. She has history of a kidney stone once many years ago. She denies any pain or blood in urine currently, symptoms do not feel the same as kidney stone previously. She is on oxybutynin 10 mg, which is helping with her urge incontinence.    She would like to be referred to a nutritionist/dietitian. She and her  have lost some weight, but she wants to keep going. She is also taking several vitamins. She would like to lose another 15 pounds. She has a gym benefit, but she does not use it like she should. She is scheduled for her [facility] yearly check next week.     She gets a yeast infection twice a year. She would like a prescription for this to use as needed. She has tried Monistat, but it is not very helpful. She has had yeast infections all her life. She may not have any one year, or she may have 2-3 in a year.     She has had pneumonia 3 times, but she has not had it in the last 5 years. She inquired if she should get a RSV vaccine.     She is up-to-date on her pneumonia vaccine. She is not interested in getting the COVID-19 vaccine.     She had a fracture in her second metatarsal and underwent surgery. She asked to renew her handicap placard. Her foot is swollen. She is not in the pool yet. Still limping. Hoping it will continue to improve.       Current medicines (including changes today)  Current Outpatient Medications   Medication Sig Dispense Refill    fluconazole (DIFLUCAN) 150 MG tablet Take 1 Tablet by mouth one time as needed (yeast infection) for up to 1 dose. 3 Tablet 0    oxybutynin SR (DITROPAN-XL) 10 MG CR tablet Take 1 Tablet by mouth every day. 90 Tablet 3     No current facility-administered medications for this visit.     She  has a past medical history of Allergic  "rhinitis, Kidney stone, Osteopenia (11/08/2019), and Sleep apnea.    ROS   No chest pain, no shortness of breath, no abdominal pain  Positive ROS as per HPI.  All other systems reviewed and are negative.     Objective:     /78   Pulse (!) 55   Temp 36.5 °C (97.7 °F)   Resp 16   Ht 1.651 m (5' 5\")   Wt 75.3 kg (166 lb)   SpO2 96%  Body mass index is 27.62 kg/m².     Physical Exam  Vital Signs    Constitutional: Alert, no distress.  Skin: Warm, dry, good turgor, no rashes in visible areas.  Eye: Equal, round and reactive, conjunctiva clear, lids normal.  ENMT: Lips without lesions, good dentition  Respiratory: Unlabored respiratory effort  Psych: Alert and oriented x3, normal affect and mood.      Results  Laboratory Studies  Labs were reviewed with the patient.        Assessment and Plan:   The following treatment plan was discussed    Assessment & Plan  1. Urinary urgency.  Unstable. Urinalysis positive for blood and leukocytes. She does have a history of kidney stone in the past, which is possible. I will send for culture. If her urine culture is negative and symptoms do not resolve, I will check imaging to evaluate for stone or other cause for hematuria if persistent.  - POCT Urinalysis  - URINE CULTURE(NEW); Future    2. Overweight with body mass index 25.0-29.9.  Unstable, improving condition. Continue with exercise and healthy diet. Referral placed for nutritionist to discuss additional diet changes and appropriate supplementation, which we discussed in the office today.  - Referral to Nutrition Services    3. History of candidiasis of vagina.  Stable. She has about 3 episodes per year. I refilled fluconazole for as needed use.  - fluconazole (DIFLUCAN) 150 MG tablet; Take 1 Tablet by mouth one time as needed (yeast infection) for up to 1 dose.  Dispense: 3 Tablet; Refill: 0    4. Urge incontinence of urine.  Stable on oxybutynin 10 mg daily.  - oxybutynin SR (DITROPAN-XL) 10 MG CR tablet; Take 1 " Tablet by mouth every day.  Dispense: 90 Tablet; Refill: 3    5. Moderate episode of recurrent major episode depressive disorder.  It is stable, resolved due to multiple deaths in the family over the past year. She is doing well with lifestyle management and has family support.    6. Vitamin D insufficiency  Due for labs    7. Pure hypercholesterolemia  - Lipid Profile; Future    8. Osteopenia of multiple sites  - VITAMIN D,25 HYDROXY (DEFICIENCY); Future    9. Encounter for screening for hematologic disorder  - CBC WITH DIFFERENTIAL; Future    10. Encounter for screening for metabolic disorder  - Comp Metabolic Panel; Future    11. Screening for thyroid disorder  - TSH WITH REFLEX TO FT4; Future    Follow-up  If her urine culture is negative, she will follow up with me.      The MA is performing the above orders under the direction of Dr. Machado    Please note that this dictation was created using voice recognition software. I have made every reasonable attempt to correct obvious errors, but I expect that there are errors of grammar and possibly content that I did not discover before finalizing the note.      Attestation      Verbal consent was acquired by the patient to use [email or web address] ambient listening note generation during this visit Yes

## 2024-02-25 LAB
BACTERIA UR CULT: ABNORMAL
BACTERIA UR CULT: ABNORMAL
SIGNIFICANT IND 70042: ABNORMAL
SITE SITE: ABNORMAL
SOURCE SOURCE: ABNORMAL

## 2024-02-26 ENCOUNTER — TELEPHONE (OUTPATIENT)
Dept: MEDICAL GROUP | Facility: MEDICAL CENTER | Age: 69
End: 2024-02-26
Payer: MEDICARE

## 2024-02-26 DIAGNOSIS — N30.01 ACUTE CYSTITIS WITH HEMATURIA: ICD-10-CM

## 2024-02-26 RX ORDER — NITROFURANTOIN 25; 75 MG/1; MG/1
100 CAPSULE ORAL 2 TIMES DAILY
Qty: 10 CAPSULE | Refills: 0 | Status: SHIPPED | OUTPATIENT
Start: 2024-02-26 | End: 2024-03-02

## 2024-02-27 NOTE — ASSESSMENT & PLAN NOTE
Chronic, stable. Last lipid panel in Nov 2022 results below. She is not currently taking any medications. We discussed her dietary/lifestyle regimen. Follow up with PCP for continued monitoring and management.    Lab Results   Component Value Date/Time    CHOLSTRLTOT 197 11/21/2022 07:43 AM    TRIGLYCERIDE 77 11/21/2022 07:43 AM    HDL 76 11/21/2022 07:43 AM     (H) 11/21/2022 07:43 AM

## 2024-02-27 NOTE — ASSESSMENT & PLAN NOTE
Chronic, stable. Last DEXA in 2023 revealed lumbar spine T score of -1.8 and proximal left femur T score of -1.1. She does not take calcium and vitamin D supplementation. Does engage in weightbearing exercise. No hx of fractures. Continue to follow up with PCP as previously scheduled.

## 2024-02-27 NOTE — ASSESSMENT & PLAN NOTE
Chronic, stable. PHQ-9 in office today is ____. Currently not utilizing any pharmacotherapy. States she feels her mood has been well controlled. She sees psych?? Therapy?? Follow up with PCP as needed.

## 2024-02-28 ENCOUNTER — OFFICE VISIT (OUTPATIENT)
Dept: FAMILY PLANNING/WOMEN'S HEALTH CLINIC | Facility: PHYSICIAN GROUP | Age: 69
End: 2024-02-28
Payer: MEDICARE

## 2024-02-28 VITALS
HEIGHT: 64 IN | SYSTOLIC BLOOD PRESSURE: 110 MMHG | BODY MASS INDEX: 28.17 KG/M2 | DIASTOLIC BLOOD PRESSURE: 68 MMHG | WEIGHT: 165 LBS

## 2024-02-28 DIAGNOSIS — E78.00 PURE HYPERCHOLESTEROLEMIA: ICD-10-CM

## 2024-02-28 DIAGNOSIS — M85.89 OSTEOPENIA OF MULTIPLE SITES: ICD-10-CM

## 2024-02-28 DIAGNOSIS — G47.33 OSA (OBSTRUCTIVE SLEEP APNEA): ICD-10-CM

## 2024-02-28 PROBLEM — F33.1 MODERATE EPISODE OF RECURRENT MAJOR DEPRESSIVE DISORDER (HCC): Status: RESOLVED | Noted: 2022-09-30 | Resolved: 2024-02-28

## 2024-02-28 PROCEDURE — 3078F DIAST BP <80 MM HG: CPT

## 2024-02-28 PROCEDURE — 1126F AMNT PAIN NOTED NONE PRSNT: CPT

## 2024-02-28 PROCEDURE — 3074F SYST BP LT 130 MM HG: CPT

## 2024-02-28 PROCEDURE — G0439 PPPS, SUBSEQ VISIT: HCPCS

## 2024-02-28 SDOH — ECONOMIC STABILITY: FOOD INSECURITY: WITHIN THE PAST 12 MONTHS, YOU WORRIED THAT YOUR FOOD WOULD RUN OUT BEFORE YOU GOT MONEY TO BUY MORE.: NEVER TRUE

## 2024-02-28 SDOH — ECONOMIC STABILITY: HOUSING INSECURITY
IN THE LAST 12 MONTHS, WAS THERE A TIME WHEN YOU DID NOT HAVE A STEADY PLACE TO SLEEP OR SLEPT IN A SHELTER (INCLUDING NOW)?: NO

## 2024-02-28 SDOH — ECONOMIC STABILITY: FOOD INSECURITY: WITHIN THE PAST 12 MONTHS, THE FOOD YOU BOUGHT JUST DIDN'T LAST AND YOU DIDN'T HAVE MONEY TO GET MORE.: NEVER TRUE

## 2024-02-28 SDOH — ECONOMIC STABILITY: INCOME INSECURITY: IN THE LAST 12 MONTHS, WAS THERE A TIME WHEN YOU WERE NOT ABLE TO PAY THE MORTGAGE OR RENT ON TIME?: NO

## 2024-02-28 SDOH — ECONOMIC STABILITY: HOUSING INSECURITY: IN THE LAST 12 MONTHS, HOW MANY PLACES HAVE YOU LIVED?: 1

## 2024-02-28 SDOH — ECONOMIC STABILITY: INCOME INSECURITY: HOW HARD IS IT FOR YOU TO PAY FOR THE VERY BASICS LIKE FOOD, HOUSING, MEDICAL CARE, AND HEATING?: NOT HARD AT ALL

## 2024-02-28 SDOH — ECONOMIC STABILITY: TRANSPORTATION INSECURITY
IN THE PAST 12 MONTHS, HAS LACK OF TRANSPORTATION KEPT YOU FROM MEETINGS, WORK, OR FROM GETTING THINGS NEEDED FOR DAILY LIVING?: NO

## 2024-02-28 SDOH — ECONOMIC STABILITY: TRANSPORTATION INSECURITY
IN THE PAST 12 MONTHS, HAS THE LACK OF TRANSPORTATION KEPT YOU FROM MEDICAL APPOINTMENTS OR FROM GETTING MEDICATIONS?: NO

## 2024-02-28 ASSESSMENT — FIBROSIS 4 INDEX: FIB4 SCORE: 1.21

## 2024-02-28 ASSESSMENT — ACTIVITIES OF DAILY LIVING (ADL): BATHING_REQUIRES_ASSISTANCE: 0

## 2024-02-28 ASSESSMENT — PATIENT HEALTH QUESTIONNAIRE - PHQ9: CLINICAL INTERPRETATION OF PHQ2 SCORE: 0

## 2024-02-28 ASSESSMENT — ENCOUNTER SYMPTOMS: GENERAL WELL-BEING: FAIR

## 2024-02-28 ASSESSMENT — PAIN SCALES - GENERAL: PAINLEVEL: NO PAIN

## 2024-02-29 NOTE — PROGRESS NOTES
Comprehensive Health Assessment Program     Mey Araujo is a 68 y.o. here for her comprehensive health assessment.    Patient Active Problem List    Diagnosis Date Noted    Pain of left foot 10/24/2023    Treatment-emergent central sleep apnea 10/16/2023    Subacute cough 08/23/2023    Urge incontinence of urine 07/24/2023    Left foot pain 02/03/2023    Urinary incontinence 01/25/2023    RICHARD (generalized anxiety disorder) 09/30/2022    BMI 29.0-29.9,adult 04/11/2022    Overweight with body mass index (BMI) 25.0-29.9 04/11/2022    Syncope 04/11/2022    Dizziness 10/28/2021    Pure hypercholesterolemia 02/22/2021    Plantar fasciitis 02/22/2021    Obesity (BMI 30-39.9) 02/22/2021    DONN (obstructive sleep apnea) 05/12/2020    Non-smoker 05/12/2020    Nocturnal hypoxia 12/06/2019    Vitamin D insufficiency 12/06/2019    Snoring 11/08/2019    Finger pain, left 11/08/2019    Swelling of finger joint of left hand 11/08/2019    Osteopenia of multiple sites 11/08/2019    Preventative health care 04/06/2016    History of kidney stones 04/06/2016    Diarrhea of presumed infectious origin 03/12/2016    Headache 03/10/2016    History of pneumonia 03/10/2016       Current Outpatient Medications   Medication Sig Dispense Refill    nitrofurantoin (MACROBID) 100 MG Cap Take 1 Capsule by mouth 2 times a day for 5 days. 10 Capsule 0    fluconazole (DIFLUCAN) 150 MG tablet Take 1 Tablet by mouth one time as needed (yeast infection) for up to 1 dose. 3 Tablet 0    oxybutynin SR (DITROPAN-XL) 10 MG CR tablet Take 1 Tablet by mouth every day. 90 Tablet 3     No current facility-administered medications for this visit.          Current supplements as per medication list.     Allergies:   Pcn [penicillins], Avocado, and Hydrocodone  Social History     Tobacco Use    Smoking status: Never    Smokeless tobacco: Never   Vaping Use    Vaping Use: Never used   Substance Use Topics    Alcohol use: Yes     Comment: 2 glasses wine 3  d/wk    Drug use: No     Family History   Problem Relation Age of Onset    Cancer Father         prostate ca, skin cancer.     Lung Disease Father         pulmonary fibrosis    Other Sister         sleep apnea    Psychiatric Illness Sister         mariella Mathias  has a past medical history of Allergic rhinitis, Kidney stone, Moderate episode of recurrent major depressive disorder (HCC), Osteopenia (11/08/2019), and Sleep apnea.   Past Surgical History:   Procedure Laterality Date    MS REPAIR NON/MALUNION METATARSAL Left 11/8/2023    Procedure: LEFT SECOND METATARSAL NONUNION REPAIR;  Surgeon: Getachew Morgan M.D.;  Location: [facility];  Service: Orthopedics    PB FUSION FOOT BONE,MIDTARSAL,1 JT Left 11/8/2023    Procedure: POSSIBLE LEFT SECOND TARSOMETATARSAL FUSION, REPAIRS AS INDICATED;  Surgeon: Getachew Morgan M.D.;  Location: [facility];  Service: Orthopedics    NEPHROLITHOTOMY      SINUSCOPE      TONSILLECTOMY      TONSILLECTOMY AND ADENOIDECTOMY         Screening:  In the last six months have you experienced any leakage of urine? Yes, she takes oxybutynin which helps. Has not had to wear liner for protection since starting the medication.     Depression Screening  Little interest or pleasure in doing things?  0 - not at all  Feeling down, depressed , or hopeless? 0 - not at all  Patient Health Questionnaire Score: 0     If depressive symptoms identified deferred to follow up visit unless specifically addressed in assessment and plan.    Interpretation of PHQ-9 Total Score   Score Severity   1-4 No Depression   5-9 Mild Depression   10-14 Moderate Depression   15-19 Moderately Severe Depression   20-27 Severe Depression    Screening for Cognitive Impairment  Do you or any of your friends or family members have any concern about your memory? No  Three Minute Recall (Banana, Sunrise, Chair) 3/3    Robert clock face with all 12 numbers and set the hands to show 20  past 8.  Yes    Cognitive concerns identified deferred for follow up unless specifically addressed in assessment and plan.    Fall Risk Assessment  Has the patient had two or more falls in the last year or any fall with injury in the last year?  Yes, not a balance issue. She states she is just clumsy.     Safety Assessment  Do you always wear your seatbelt?  Yes  Any changes to home needed to function safely? No  Difficulty hearing.  No  Patient counseled about all safety risks that were identified.    Functional Assessment ADLs  Are there any barriers preventing you from cooking for yourself or meeting nutritional needs?  No.    Are there any barriers preventing you from driving safely or obtaining transportation?  No.    Are there any barriers preventing you from using a telephone or calling for help?  No    Are there any barriers preventing you from shopping?  No.    Are there any barriers preventing you from taking care of your own finances?  No    Are there any barriers preventing you from managing your medications?  No    Are there any barriers preventing you from showering, bathing or dressing yourself? No    Are there any barriers preventing you from doing housework or laundry? No  Are there any barriers preventing you from using the toilet?No  Are you currently engaging in any exercise or physical activity?  Yes. Daily walking     Self-Assessment of Health  What is your perception of your health? Fair  Do you sleep more than six hours a night? Yes  In the past 7 days, how much did pain keep you from doing your normal work? None  Do you spend quality time with family or friends (virtually or in person)? Yes  Do you usually eat a heart healthy diet that constists of a variety of fruits, vegetables, whole grains and fiber? Yes  Do you eat foods high in fat and/or Fast Food more than three times per week? No    Advance Care Planning  Do you have an Advance Directive, Living Will, Durable Power of , or  POLST? Yes  Advance Directive Living Will Durable Power of    is on file      Health Maintenance Summary            Overdue - Zoster (Shingles) Vaccines (1 of 2) Never done      No completion history exists for this topic.              Overdue - COVID-19 Vaccine (3 - 2023-24 season) Overdue since 9/1/2023 03/17/2021  Imm Admin: PFIZER PURPLE CAP SARS-COV-2 VACCINATION (12+)    02/13/2021  Imm Admin: PFIZER PURPLE CAP SARS-COV-2 VACCINATION (12+)              Scheduled - Mammogram (Yearly) Scheduled for 5/15/2024      05/10/2023  MA-SCREENING MAMMO BILAT W/TOMOSYNTHESIS W/CAD    02/04/2022  MA-SCREENING MAMMO BILAT W/TOMOSYNTHESIS W/CAD    02/01/2021  MA-SCREENING MAMMO BILAT W/TOMOSYNTHESIS W/CAD    01/30/2020  MA-SCREENING MAMMO BILAT W/TOMOSYNTHESIS W/CAD    12/09/2016  Done    Only the first 5 history entries have been loaded, but more history exists.              Colorectal Cancer Screening (Colonoscopy - Every 10 Years) Next due on 6/10/2024      06/10/2014  Colonoscopy (Done)              Annual Wellness Visit (Yearly) Next due on 2/28/2025 02/28/2024  Level of Service: MI ANNUAL WELLNESS VISIT-INCLUDES PPPS SUBSEQUE*    01/25/2023  Level of Service: MI ANNUAL WELLNESS VISIT-INCLUDES PPPS SUBSEQUE*    04/11/2022  Level of Service: MI ANNUAL WELLNESS VISIT-INCLUDES PPPS SUBSEQUE*              Bone Density Scan (Every 2 Years) Next due on 5/10/2025      05/10/2023  DS-BONE DENSITY STUDY (DEXA)    01/30/2020  DS-BONE DENSITY STUDY (DEXA)    04/20/2007  DS-BONE DENSITY STUDY (DEXA)              IMM DTaP/Tdap/Td Vaccine (2 - Td or Tdap) Next due on 12/6/2029 12/06/2019  Imm Admin: Tdap Vaccine              Hepatitis C Screening  Tentatively Complete      11/13/2019  HEP C VIRUS ANTIBODY              Pneumococcal Vaccine: 65+ Years (Series Information) Completed      09/05/2023  Imm Admin: Pneumococcal Conjugate Vaccine (PCV20)    08/23/2023  Imm Admin: Pneumococcal Conjugate Vaccine (PCV20)     04/07/2022  Imm Admin: Pneumococcal polysaccharide vaccine (PPSV-23)              Influenza Vaccine (Series Information) Completed      10/02/2023  Imm Admin: Influenza Vaccine Adult HD    09/26/2022  Imm Admin: Influenza Vaccine Adult HD    10/07/2019  Imm Admin: Influenza Vaccine Quad Inj (Preserved)    10/07/2019  Imm Admin: Influenza Vaccine Quad Inj (Pf)    11/05/2018  Imm Admin: Influenza Vaccine Quad Inj (Pf)    Only the first 5 history entries have been loaded, but more history exists.              Hepatitis A Vaccine (Hep A) (Series Information) Aged Out      No completion history exists for this topic.              Hepatitis B Vaccine (Hep B) (Series Information) Aged Out      No completion history exists for this topic.              HPV Vaccines (Series Information) Aged Out      No completion history exists for this topic.              Polio Vaccine (Inactivated Polio) (Series Information) Aged Out      No completion history exists for this topic.              Meningococcal Immunization (Series Information) Aged Out      No completion history exists for this topic.              Discontinued - Cervical Cancer Screening  Discontinued        Frequency changed to Never automatically (Topic No Longer Applies)    05/20/2020  PAP IG (IMAGE GUIDED)    06/06/2014  Done                    Patient Care Team:  FRANCY Gunn as PCP - General (Family Medicine)  FRANCY Gunn as PCP - H Paneled (Family Medicine)  Preferred home care  as Respiratory Therapist (DME Supplier)      Financial Resource Strain: Low Risk  (2/28/2024)    Overall Financial Resource Strain (CARDIA)     Difficulty of Paying Living Expenses: Not hard at all      Transportation Needs: No Transportation Needs (2/28/2024)    PRAPARE - Transportation     Lack of Transportation (Medical): No     Lack of Transportation (Non-Medical): No      Food Insecurity: No Food Insecurity (2/28/2024)    Hunger Vital Sign     Worried  "About Running Out of Food in the Last Year: Never true     Ran Out of Food in the Last Year: Never true        Encounter Vitals  Blood Pressure : 110/68  Weight: 74.8 kg (165 lb)  Height: 162.6 cm (5' 4\")  BMI (Calculated): 28.32  Pain Score: No pain     Alert, oriented in no acute distress.  Eye contact is good, speech goal directed, affect calm.    Assessment and Plan. The following treatment and monitoring plan is recommended:  DONN (obstructive sleep apnea)  Chronic, stable. Compliant with CPAP at night. Does not require supplemental O2. Follows with pulmonology.       Osteopenia of multiple sites  Chronic, stable. Last DEXA in 2023 revealed lumbar spine T score of -1.8 and proximal left femur T score of -1.1. She does not take calcium and vitamin D supplementation. Does engage in weightbearing exercise. No hx of fractures. Continue to follow up with PCP as previously scheduled.      Pure hypercholesterolemia  Chronic, stable. Last lipid panel in Nov 2022 results below. She is not currently taking any medications. We discussed her dietary/lifestyle regimen. Follow up with PCP for continued monitoring and management.    Lab Results   Component Value Date/Time    CHOLSTRLTOT 197 11/21/2022 07:43 AM    TRIGLYCERIDE 77 11/21/2022 07:43 AM    HDL 76 11/21/2022 07:43 AM     (H) 11/21/2022 07:43 AM         Services suggested: No services needed at this time  Health Care Screening: Age-appropriate preventive services recommended by USPTF and ACIP covered by Medicare were discussed today. Services ordered if indicated and agreed upon by the patient.  Referrals offered: Community-based lifestyle interventions to reduce health risks and promote self-management and wellness, fall prevention, nutrition, physical activity, tobacco-use cessation, weight loss, and mental health services as per orders if indicated.    Discussion today about general wellness and lifestyle habits:    Prevent falls and reduce trip hazards; " Cautioned about securing or removing rugs.  Have a working fire alarm and carbon monoxide detector.  Engage in regular physical activity and social activities.    Follow-up: Return for appointment with Primary Care Provider as needed..

## 2024-03-14 ENCOUNTER — HOSPITAL ENCOUNTER (OUTPATIENT)
Dept: LAB | Facility: MEDICAL CENTER | Age: 69
End: 2024-03-14
Attending: NURSE PRACTITIONER
Payer: MEDICARE

## 2024-03-14 DIAGNOSIS — E78.00 PURE HYPERCHOLESTEROLEMIA: ICD-10-CM

## 2024-03-14 DIAGNOSIS — Z13.0 ENCOUNTER FOR SCREENING FOR HEMATOLOGIC DISORDER: ICD-10-CM

## 2024-03-14 DIAGNOSIS — M85.89 OSTEOPENIA OF MULTIPLE SITES: ICD-10-CM

## 2024-03-14 DIAGNOSIS — Z13.29 SCREENING FOR THYROID DISORDER: ICD-10-CM

## 2024-03-14 DIAGNOSIS — Z13.228 ENCOUNTER FOR SCREENING FOR METABOLIC DISORDER: ICD-10-CM

## 2024-03-14 LAB
25(OH)D3 SERPL-MCNC: 37 NG/ML (ref 30–100)
ALBUMIN SERPL BCP-MCNC: 4.2 G/DL (ref 3.2–4.9)
ALBUMIN/GLOB SERPL: 1.4 G/DL
ALP SERPL-CCNC: 81 U/L (ref 30–99)
ALT SERPL-CCNC: 15 U/L (ref 2–50)
ANION GAP SERPL CALC-SCNC: 11 MMOL/L (ref 7–16)
AST SERPL-CCNC: 19 U/L (ref 12–45)
BASOPHILS # BLD AUTO: 1.2 % (ref 0–1.8)
BASOPHILS # BLD: 0.06 K/UL (ref 0–0.12)
BILIRUB SERPL-MCNC: 0.6 MG/DL (ref 0.1–1.5)
BUN SERPL-MCNC: 20 MG/DL (ref 8–22)
CALCIUM ALBUM COR SERPL-MCNC: 9.1 MG/DL (ref 8.5–10.5)
CALCIUM SERPL-MCNC: 9.3 MG/DL (ref 8.5–10.5)
CHLORIDE SERPL-SCNC: 103 MMOL/L (ref 96–112)
CHOLEST SERPL-MCNC: 192 MG/DL (ref 100–199)
CO2 SERPL-SCNC: 25 MMOL/L (ref 20–33)
CREAT SERPL-MCNC: 0.66 MG/DL (ref 0.5–1.4)
EOSINOPHIL # BLD AUTO: 0.24 K/UL (ref 0–0.51)
EOSINOPHIL NFR BLD: 4.8 % (ref 0–6.9)
ERYTHROCYTE [DISTWIDTH] IN BLOOD BY AUTOMATED COUNT: 46.1 FL (ref 35.9–50)
FASTING STATUS PATIENT QL REPORTED: NORMAL
GFR SERPLBLD CREATININE-BSD FMLA CKD-EPI: 96 ML/MIN/1.73 M 2
GLOBULIN SER CALC-MCNC: 3 G/DL (ref 1.9–3.5)
GLUCOSE SERPL-MCNC: 94 MG/DL (ref 65–99)
HCT VFR BLD AUTO: 43.6 % (ref 37–47)
HDLC SERPL-MCNC: 81 MG/DL
HGB BLD-MCNC: 13.6 G/DL (ref 12–16)
IMM GRANULOCYTES # BLD AUTO: 0.01 K/UL (ref 0–0.11)
IMM GRANULOCYTES NFR BLD AUTO: 0.2 % (ref 0–0.9)
LDLC SERPL CALC-MCNC: 100 MG/DL
LYMPHOCYTES # BLD AUTO: 1.82 K/UL (ref 1–4.8)
LYMPHOCYTES NFR BLD: 36.5 % (ref 22–41)
MCH RBC QN AUTO: 28.3 PG (ref 27–33)
MCHC RBC AUTO-ENTMCNC: 31.2 G/DL (ref 32.2–35.5)
MCV RBC AUTO: 90.6 FL (ref 81.4–97.8)
MONOCYTES # BLD AUTO: 0.41 K/UL (ref 0–0.85)
MONOCYTES NFR BLD AUTO: 8.2 % (ref 0–13.4)
NEUTROPHILS # BLD AUTO: 2.44 K/UL (ref 1.82–7.42)
NEUTROPHILS NFR BLD: 49.1 % (ref 44–72)
NRBC # BLD AUTO: 0 K/UL
NRBC BLD-RTO: 0 /100 WBC (ref 0–0.2)
PLATELET # BLD AUTO: 296 K/UL (ref 164–446)
PMV BLD AUTO: 11.2 FL (ref 9–12.9)
POTASSIUM SERPL-SCNC: 4.3 MMOL/L (ref 3.6–5.5)
PROT SERPL-MCNC: 7.2 G/DL (ref 6–8.2)
RBC # BLD AUTO: 4.81 M/UL (ref 4.2–5.4)
SODIUM SERPL-SCNC: 139 MMOL/L (ref 135–145)
TRIGL SERPL-MCNC: 54 MG/DL (ref 0–149)
TSH SERPL DL<=0.005 MIU/L-ACNC: 4.14 UIU/ML (ref 0.38–5.33)
WBC # BLD AUTO: 5 K/UL (ref 4.8–10.8)

## 2024-03-14 PROCEDURE — 82306 VITAMIN D 25 HYDROXY: CPT

## 2024-03-14 PROCEDURE — 80061 LIPID PANEL: CPT

## 2024-03-14 PROCEDURE — 85025 COMPLETE CBC W/AUTO DIFF WBC: CPT

## 2024-03-14 PROCEDURE — 84443 ASSAY THYROID STIM HORMONE: CPT

## 2024-03-14 PROCEDURE — 36415 COLL VENOUS BLD VENIPUNCTURE: CPT

## 2024-03-14 PROCEDURE — 80053 COMPREHEN METABOLIC PANEL: CPT

## 2024-05-08 ENCOUNTER — APPOINTMENT (RX ONLY)
Dept: URBAN - METROPOLITAN AREA CLINIC 4 | Facility: CLINIC | Age: 69
Setting detail: DERMATOLOGY
End: 2024-05-08

## 2024-05-08 DIAGNOSIS — L82.0 INFLAMED SEBORRHEIC KERATOSIS: ICD-10-CM

## 2024-05-08 DIAGNOSIS — L82.1 OTHER SEBORRHEIC KERATOSIS: ICD-10-CM

## 2024-05-08 PROCEDURE — ? LIQUID NITROGEN

## 2024-05-08 PROCEDURE — 99213 OFFICE O/P EST LOW 20 MIN: CPT

## 2024-05-08 PROCEDURE — ? COUNSELING

## 2024-05-08 ASSESSMENT — LOCATION DETAILED DESCRIPTION DERM
LOCATION DETAILED: RIGHT CENTRAL MALAR CHEEK
LOCATION DETAILED: LEFT INFERIOR MEDIAL FOREHEAD
LOCATION DETAILED: LEFT FOREHEAD
LOCATION DETAILED: LEFT MEDIAL TEMPLE
LOCATION DETAILED: LEFT INFERIOR FOREHEAD
LOCATION DETAILED: LEFT MEDIAL FOREHEAD
LOCATION DETAILED: LEFT INFERIOR LATERAL FOREHEAD
LOCATION DETAILED: NASAL DORSUM
LOCATION DETAILED: RIGHT SUPERIOR FOREHEAD

## 2024-05-08 ASSESSMENT — LOCATION SIMPLE DESCRIPTION DERM
LOCATION SIMPLE: RIGHT FOREHEAD
LOCATION SIMPLE: RIGHT CHEEK
LOCATION SIMPLE: NOSE
LOCATION SIMPLE: LEFT FOREHEAD
LOCATION SIMPLE: LEFT TEMPLE

## 2024-05-08 ASSESSMENT — LOCATION ZONE DERM
LOCATION ZONE: NOSE
LOCATION ZONE: FACE

## 2024-05-08 NOTE — PROCEDURE: LIQUID NITROGEN
Medical Necessity Clause: This procedure was medically necessary because the lesions that were treated were:
Post-Care Instructions: I reviewed with the patient in detail post-care instructions. Patient is to wear sunprotection, and avoid picking at any of the treated lesions. Pt may apply Vaseline to crusted or scabbing areas.
Show Spray Paint Technique Variable?: Yes
Render Post-Care Instructions In Note?: no
Medical Necessity Information: It is in your best interest to select a reason for this procedure from the list below. All of these items fulfill various CMS LCD requirements except the new and changing color options.
Duration Of Freeze Thaw-Cycle (Seconds): 0
Detail Level: Detailed
Consent: The patient's consent was obtained including but not limited to risks of crusting, scabbing, blistering, scarring, darker or lighter pigmentary change, recurrence, incomplete removal and infection.
Spray Paint Text: The liquid nitrogen was applied to the skin utilizing a spray paint frosting technique.

## 2024-05-15 ENCOUNTER — HOSPITAL ENCOUNTER (OUTPATIENT)
Dept: RADIOLOGY | Facility: MEDICAL CENTER | Age: 69
End: 2024-05-15
Attending: NURSE PRACTITIONER
Payer: MEDICARE

## 2024-05-15 DIAGNOSIS — Z12.31 VISIT FOR SCREENING MAMMOGRAM: ICD-10-CM

## 2024-07-11 ENCOUNTER — APPOINTMENT (OUTPATIENT)
Dept: MEDICAL GROUP | Facility: MEDICAL CENTER | Age: 69
End: 2024-07-11
Payer: MEDICARE

## 2024-07-31 ENCOUNTER — APPOINTMENT (OUTPATIENT)
Dept: SLEEP MEDICINE | Facility: MEDICAL CENTER | Age: 69
End: 2024-07-31
Attending: PHYSICIAN ASSISTANT
Payer: MEDICARE

## 2024-09-11 ENCOUNTER — APPOINTMENT (OUTPATIENT)
Dept: SLEEP MEDICINE | Facility: MEDICAL CENTER | Age: 69
End: 2024-09-11
Payer: MEDICARE

## 2024-09-11 VITALS
HEART RATE: 57 BPM | HEIGHT: 64 IN | BODY MASS INDEX: 29.02 KG/M2 | WEIGHT: 170 LBS | OXYGEN SATURATION: 98 % | RESPIRATION RATE: 14 BRPM | SYSTOLIC BLOOD PRESSURE: 114 MMHG | DIASTOLIC BLOOD PRESSURE: 74 MMHG

## 2024-09-11 DIAGNOSIS — G47.39 TREATMENT-EMERGENT CENTRAL SLEEP APNEA: ICD-10-CM

## 2024-09-11 DIAGNOSIS — G47.33 OSA (OBSTRUCTIVE SLEEP APNEA): ICD-10-CM

## 2024-09-11 PROCEDURE — 3074F SYST BP LT 130 MM HG: CPT | Performed by: NURSE PRACTITIONER

## 2024-09-11 PROCEDURE — 99213 OFFICE O/P EST LOW 20 MIN: CPT | Performed by: NURSE PRACTITIONER

## 2024-09-11 PROCEDURE — 3078F DIAST BP <80 MM HG: CPT | Performed by: NURSE PRACTITIONER

## 2024-09-11 PROCEDURE — 99214 OFFICE O/P EST MOD 30 MIN: CPT | Performed by: NURSE PRACTITIONER

## 2024-09-11 ASSESSMENT — FIBROSIS 4 INDEX: FIB4 SCORE: 1.11

## 2024-09-11 NOTE — PROGRESS NOTES
Chief Complaint   Patient presents with    Follow-Up     SLEEP - ESTABLISHED PT CHART PREP COMPLETED ON 09/04/2024     Last Office Visit 10/16/2023 with     1st Compliance: No     DME: [facility]  Set pressure (cmH2O)11.0  Settings: AirSense 10 AutoSet     Wireless Data? YES, If not wireless contact pt to bring in device. RESMED    OAT ? : N/A , DDS who provided OAT? N/A       HPI:  Mey Araujo is a 69 y.o. year old female here today for follow-up on DONN f/u. PMH includes pure hypercholesterolemia, obesity, dizziness, DONN, never smoker, T&A, headache, h/o syncope, COVID-19 positive antibodies.     Patient has a ResMed CPAP machine that she obtained around June 2020.  Patient reports that she inherited a ResMed machine from her mother-in-law who recently passed away and would like to use it as a spare when she travels.  Is currently using a nasal pillow and heated tubing.  Denies any difficulty with mask fit or pressures.  Averages 7 to 9 hours of sleep and denies any difficulty falling or staying asleep.  Overall notes improvement in sleep quality when using CPAP and denies any excessive daytime sleepiness, morning headaches, palpitations, concentration or memory problems.    30-day compliance reviewed with patient shows CPAP at 11 cm/H2O.  There is 70% use with an average of 7 hours and 31 minutes and a residual AHI of 1.9 with no evidence of persistent mask leak.    Sleep Study History:   OPO on CPAP 11 cmH2O from 6/14/22 indicated the bijal saturation was 82 percent..  The patient spent 0.8 minutes with saturations < 88%.  Overall, this continuous nocturnal oximetry demonstrates normal saturation while on positive airway pressure therapy.     PSG split night study from 5/31/20 indicated very severe obstructive sleep apnea hypopnea with an apnea hypopnea index of 59.3 events per hour.  Severe nocturnal hypoxemia with a lowest arterial oxygen saturation of 73% on room air. Fragmentation of sleep related  "to the breathing events and to periodic limb movements.  There is an excellent response to CPAP therapy.  Some treatment emergent central apneas are identified at the end of the CPAP titration suggesting an over titration effect but complex sleep apnea cannot be excluded.    ROS: As per HPI and otherwise negative if not stated.    Past Medical History:   Diagnosis Date    Allergic rhinitis     Kidney stone     Moderate episode of recurrent major depressive disorder (HCC)     Osteopenia 11/08/2019    Sleep apnea        Past Surgical History:   Procedure Laterality Date    PA REPAIR NON/MALUNION METATARSAL Left 11/8/2023    Procedure: LEFT SECOND METATARSAL NONUNION REPAIR;  Surgeon: Getachew Morgan M.D.;  Location: [facility];  Service: Orthopedics    PB FUSION FOOT BONE,MIDTARSAL,1 JT Left 11/8/2023    Procedure: POSSIBLE LEFT SECOND TARSOMETATARSAL FUSION, REPAIRS AS INDICATED;  Surgeon: Getachew Morgan M.D.;  Location: [facility];  Service: Orthopedics    NEPHROLITHOTOMY      SINUSCOPE      TONSILLECTOMY      TONSILLECTOMY AND ADENOIDECTOMY         Family History   Problem Relation Age of Onset    Cancer Father         prostate ca, skin cancer.     Lung Disease Father         pulmonary fibrosis    Other Sister         sleep apnea    Psychiatric Illness Sister         depression       Allergies as of 09/11/2024 - Reviewed 09/11/2024   Allergen Reaction Noted    Pcn [penicillins] Rash 03/07/2016    Avocado Diarrhea 02/28/2024    Hydrocodone  03/10/2016        Vitals:  /74 (BP Location: Left arm, Patient Position: Sitting, BP Cuff Size: Adult)   Pulse (!) 57   Resp 14   Ht 1.626 m (5' 4\")   Wt 77.1 kg (170 lb)   SpO2 98%     Current medications as of today   Current Outpatient Medications   Medication Sig Dispense Refill    oxybutynin SR (DITROPAN-XL) 10 MG CR tablet Take 1 Tablet by mouth every day. 90 Tablet 3    fluconazole (DIFLUCAN) 150 MG tablet Take 1 Tablet " by mouth one time as needed (yeast infection) for up to 1 dose. 3 Tablet 0     No current facility-administered medications for this visit.         Physical Exam:   Gen:           Alert and oriented, No apparent distress. Mood and affect appropriate, normal interaction with examiner.  Eyes:          PERRL, EOM intact, sclere white, conjunctive moist.  Ears:          Not examined.   Hearing:     Grossly intact.  Nose:          Normal, no lesions or deformities.  Dentition:    Good dentition.  Oropharynx:   Tongue normal, posterior pharynx without erythema or exudate.  Neck:        Supple, trachea midline, no masses.  Respiratory Effort: No intercostal retractions or use of accessory muscles.   Lung Auscultation:      Clear to auscultation bilaterally; no rales, rhonchi or wheezing.  CV:            Regular rate and rhythm. No murmurs, rubs or gallops.  Abd:           Not examined.   Lymphadenopathy: Not examined.  Gait and Station: Normal.  Digits and Nails: No clubbing, cyanosis, petechiae, or nodes.   Cranial Nerves: II-XII grossly intact.  Skin:        No rashes, lesions or ulcers noted.               Ext:           No cyanosis or edema.      Assessment:  1. DONN (obstructive sleep apnea)  DME Mask and Supplies      2. Treatment-emergent central sleep apnea  DME Mask and Supplies        Plan:  Patient is using and benefiting from CPAP therapy.  Compliance shows adequate use and control of DONN with a residual AHI of 1.9.  Patient has a travel CPAP and she got a ResMed device that she uses daily.  Order placed for mask and supplies will be good for 1 year.  Advise annual follow-up, but can be seen sooner if needed.    Please note that this dictation was created using voice recognition software. I have made every reasonable attempt to correct obvious errors, but it is possible there are errors of grammar and possibly content that I did not discover before finalizing the note.

## 2024-09-12 ENCOUNTER — APPOINTMENT (OUTPATIENT)
Dept: MEDICAL GROUP | Facility: MEDICAL CENTER | Age: 69
End: 2024-09-12
Payer: MEDICARE

## 2024-10-24 ENCOUNTER — NON-PROVIDER VISIT (OUTPATIENT)
Dept: MEDICAL GROUP | Facility: MEDICAL CENTER | Age: 69
End: 2024-10-24
Payer: MEDICARE

## 2024-10-24 DIAGNOSIS — Z23 NEED FOR VACCINATION: ICD-10-CM

## 2024-10-24 PROCEDURE — 90662 IIV NO PRSV INCREASED AG IM: CPT | Performed by: NURSE PRACTITIONER

## 2024-10-24 PROCEDURE — G0008 ADMIN INFLUENZA VIRUS VAC: HCPCS | Performed by: NURSE PRACTITIONER

## 2025-01-03 ENCOUNTER — APPOINTMENT (OUTPATIENT)
Dept: MEDICAL GROUP | Facility: MEDICAL CENTER | Age: 70
End: 2025-01-03
Payer: MEDICARE

## 2025-01-03 VITALS
DIASTOLIC BLOOD PRESSURE: 82 MMHG | SYSTOLIC BLOOD PRESSURE: 116 MMHG | OXYGEN SATURATION: 98 % | HEART RATE: 62 BPM | HEIGHT: 64 IN | BODY MASS INDEX: 29.71 KG/M2 | WEIGHT: 174 LBS | TEMPERATURE: 96.6 F

## 2025-01-03 DIAGNOSIS — Z13.0 ENCOUNTER FOR SCREENING FOR HEMATOLOGIC DISORDER: ICD-10-CM

## 2025-01-03 DIAGNOSIS — E55.9 VITAMIN D INSUFFICIENCY: ICD-10-CM

## 2025-01-03 DIAGNOSIS — Z13.228 ENCOUNTER FOR SCREENING FOR METABOLIC DISORDER: ICD-10-CM

## 2025-01-03 DIAGNOSIS — M85.89 OSTEOPENIA OF MULTIPLE SITES: ICD-10-CM

## 2025-01-03 DIAGNOSIS — E78.00 PURE HYPERCHOLESTEROLEMIA: ICD-10-CM

## 2025-01-03 DIAGNOSIS — F33.1 MAJOR DEPRESSIVE DISORDER, RECURRENT, MODERATE (HCC): ICD-10-CM

## 2025-01-03 DIAGNOSIS — Z13.29 SCREENING FOR THYROID DISORDER: ICD-10-CM

## 2025-01-03 DIAGNOSIS — F41.1 GAD (GENERALIZED ANXIETY DISORDER): ICD-10-CM

## 2025-01-03 DIAGNOSIS — G43.109 OCULAR MIGRAINE: ICD-10-CM

## 2025-01-03 DIAGNOSIS — Z86.69 HISTORY OF MIGRAINE WITH AURA: ICD-10-CM

## 2025-01-03 DIAGNOSIS — K21.9 GASTROESOPHAGEAL REFLUX DISEASE, UNSPECIFIED WHETHER ESOPHAGITIS PRESENT: ICD-10-CM

## 2025-01-03 PROCEDURE — 3079F DIAST BP 80-89 MM HG: CPT | Performed by: NURSE PRACTITIONER

## 2025-01-03 PROCEDURE — 3074F SYST BP LT 130 MM HG: CPT | Performed by: NURSE PRACTITIONER

## 2025-01-03 PROCEDURE — 99214 OFFICE O/P EST MOD 30 MIN: CPT | Performed by: NURSE PRACTITIONER

## 2025-01-03 RX ORDER — SERTRALINE HYDROCHLORIDE 25 MG/1
25 TABLET, FILM COATED ORAL EVERY EVENING
Qty: 90 TABLET | Refills: 3 | Status: SHIPPED | OUTPATIENT
Start: 2025-01-03

## 2025-01-03 ASSESSMENT — PATIENT HEALTH QUESTIONNAIRE - PHQ9: CLINICAL INTERPRETATION OF PHQ2 SCORE: 0

## 2025-01-03 ASSESSMENT — FIBROSIS 4 INDEX: FIB4 SCORE: 1.11

## 2025-01-06 NOTE — PROGRESS NOTES
Subjective:     History of Present Illness  The patient presents for evaluation of ocular auras, anxiety, GERD, and osteoarthritis.    She has been experiencing an increase in ocular auras, which she has been documenting since March 2024. She reports that these auras are not typically associated with migraines, although she did experience one today. Post-aura, she describes a sensation of sharp vision, akin to a hangover, and discomfort when focusing on objects. She is uncertain if these symptoms are stress-induced, as they often occur after visiting her mother, who has dementia. She has a history of migraines in her teens, 20s, 30s, and 40s, which were managed with Imitrex, ibuprofen, and Tylenol 3. However, these migraines ceased around the age of 52. She has not been on any daily preventative treatment for migraines. She maintains good hydration and sleeps well with the aid of a CPAP machine. Her last eye exam was conducted a few years ago. She has noticed a decline in her right eye's vision, particularly when watching TV, and believes she may need a new prescription. She has not experienced any numbness or tingling in her face, hands, or legs, but has had occasional tingling in her left hand and arm over the past year, which sometimes results in numbness. She is considering whether a brain scan or an eye doctor visit would be beneficial.    She has been under significant stress due to her mother's worsening dementia, now in its third year. She plans to seek counseling from the [employer] to better manage her interactions with her mother. She is seeking short-term medication to help manage her anxiety, which she experiences primarily after visiting her mother. She does not believe she is depressed and reports feeling generally happy. She previously took sertraline in 2022 while caring for her mother-in-law and sister-in-law.    She has been experiencing frequent episodes of gastroesophageal reflux  "disease (GERD), characterized by severe stomach burning when lying down after eating. These episodes occur approximately 3 to 4 nights per week. She is considering whether omeprazole might be beneficial for her. She has not been taking any anti-inflammatories like ibuprofen but does take Gaviscon, which provides some relief.    She is due for her colon cancer screening. She has received both doses of the shingles vaccine through [employer].    SOCIAL HISTORY  She drinks wine 2 to 3 nights a week, more regularly during the holidays. She drinks about 2 cups of coffee in the morning.    FAMILY HISTORY  Her mother is in her third year of dementia. Her grandmother had polyarteritis nodosa. Her father gave her and her sisters Raynaud's.    MEDICATIONS  Current: CPAP, Gaviscon  Past: Imitrex, ibuprofen, Tylenol 3, sertraline    IMMUNIZATIONS  She has received both doses of the shingles vaccine.      Current medicines (including changes today)  Current Outpatient Medications   Medication Sig Dispense Refill    omeprazole (PRILOSEC) 20 MG delayed-release capsule Take 1 Capsule by mouth every day. 30 Capsule 0    sertraline (ZOLOFT) 25 MG tablet Take 1 Tablet by mouth every evening. 90 Tablet 3    oxybutynin SR (DITROPAN-XL) 10 MG CR tablet Take 1 Tablet by mouth every day. 90 Tablet 3     No current facility-administered medications for this visit.     She  has a past medical history of Allergic rhinitis, Kidney stone, Moderate episode of recurrent major depressive disorder (HCC), Osteopenia (11/08/2019), and Sleep apnea.    ROS   No chest pain, no shortness of breath, no abdominal pain  Positive ROS as per HPI.  All other systems reviewed and are negative.     Objective:     /82   Pulse 62   Temp 35.9 °C (96.6 °F) (Temporal)   Ht 1.626 m (5' 4\")   Wt 78.9 kg (174 lb)   SpO2 98%  Body mass index is 29.87 kg/m².   Physical Exam    Constitutional: Alert, no distress.  Skin: Warm, dry, good turgor, no rashes in visible " areas.  Eye: Equal, round and reactive, conjunctiva clear, lids normal.  ENMT: Lips without lesions, good dentition  Respiratory: Unlabored respiratory effort  Psych: Alert and oriented x3, normal affect and mood.      Results          Assessment and Plan:   The following treatment plan was discussed    Assessment & Plan  1. Ocular auras.  Her blood pressure readings are within normal range, suggesting that her symptoms are not hypertension-related. The possibility of these symptoms being stress-induced can not be ruled out. She has a long-standing history of migraines and auras, which have recently increased in frequency. A referral to neurology will be initiated for further evaluation. An eye examination will be recommended to rule out any ocular/vision issues. She will be advised to update her eyeglass prescription, as an incorrect prescription can lead to eye strain and trigger migraines. The potential use of preventative migraine medication will be discussed, but she is not ready to start this treatment at this time.    2. Anxiety.  She is experiencing significant stress and anxiety due to her mother's dementia. She will be advised to seek support from the [employer]. A prescription for sertraline will be provided to manage her anxiety. She will be instructed to take this medication at night to avoid potential daytime drowsiness. The risks and benefits of benzodiazepines were discussed, and she prefers to avoid them due to their addictive properties.    3. Gastroesophageal reflux disease (GERD).  Her GERD symptoms appear to be intermittent, suggesting they may be diet-related. She will be advised to monitor her diet and identify any potential triggers. A prescription for omeprazole will be provided for a duration of 2 weeks to reduce stomach acid and allow for healing. She will be advised to avoid long-term use of omeprazole due to its potential impact on bone health.    4. Osteoarthritis.  Her  symptoms are more indicative of osteoarthritis rather than rheumatoid arthritis. She will be advised to monitor her symptoms and consider over-the-counter pain relief as needed.    5. Health maintenance.  She will be advised to schedule a colonoscopy with her gastroenterologist. A DEXA scan is due on 05/10/2025, and a mammogram is due on 05/15/2025. Laboratory tests will be ordered to be completed after 03/14/2025.    Follow-up  The patient will follow up in 3 months.      ORDERS:  1. Major depressive disorder, recurrent, moderate (HCC)    2. RICHARD (generalized anxiety disorder)  - sertraline (ZOLOFT) 25 MG tablet; Take 1 Tablet by mouth every evening.  Dispense: 90 Tablet; Refill: 3    3. History of migraine with aura  - Referral to Neurology    4. Ocular migraine  - Referral to Neurology    5. Gastroesophageal reflux disease, unspecified whether esophagitis present  - omeprazole (PRILOSEC) 20 MG delayed-release capsule; Take 1 Capsule by mouth every day.  Dispense: 30 Capsule; Refill: 0    6. Vitamin D insufficiency  - VITAMIN D,25 HYDROXY (DEFICIENCY); Future    7. Pure hypercholesterolemia  - Lipid Profile; Future    8. Osteopenia of multiple sites  - VITAMIN D,25 HYDROXY (DEFICIENCY); Future    9. Encounter for screening for hematologic disorder  - CBC WITH DIFFERENTIAL; Future    10. Encounter for screening for metabolic disorder  - Comp Metabolic Panel; Future    11. Screening for thyroid disorder  - TSH WITH REFLEX TO FT4; Future          The MA is performing the above orders under the direction of Dr. Machado    Please note that this dictation was created using voice recognition software. I have made every reasonable attempt to correct obvious errors, but I expect that there are errors of grammar and possibly content that I did not discover before finalizing the note.      Attestation      Verbal consent was acquired by the patient to use [employer] ambient listening note generation during this visit Yes

## 2025-01-08 ENCOUNTER — APPOINTMENT (OUTPATIENT)
Dept: MEDICAL GROUP | Facility: MEDICAL CENTER | Age: 70
End: 2025-01-08
Payer: MEDICARE

## 2025-01-10 ENCOUNTER — TELEPHONE (OUTPATIENT)
Dept: MEDICAL GROUP | Facility: MEDICAL CENTER | Age: 70
End: 2025-01-10

## 2025-01-10 ENCOUNTER — APPOINTMENT (OUTPATIENT)
Dept: MEDICAL GROUP | Facility: MEDICAL CENTER | Age: 70
End: 2025-01-10
Payer: MEDICARE

## 2025-01-10 DIAGNOSIS — M85.89 OSTEOPENIA OF MULTIPLE SITES: ICD-10-CM

## 2025-02-11 NOTE — PROGRESS NOTES
[facility]  GENERAL NEUROLOGY  NEW PATIENT VISIT    Referral source: Sheri KELLER     CC: History of migraine with aura, Ocular migraine     HISTORY OF ILLNESS:  Mey Araujo is a 69 y.o. woman with a history most notable for migraine.  She presented to her primary provider with complaints of migraines.  She has a history of migraines starting from her teens into her 40s but stopped around her 50s.  She is reporting now auras without migraine.  It does report that she is under significant stress with taking care of her mother.  Today, Mey provided the following history:    Migraine description:    The following is a summary of headache symptoms, presented in my standard format:    Family History:   Age at onset (years):   Location:   Radiation:   Frequency:   Duration:   Headache Days/Month:   Quality:   Intensity:   Aura:   Photophobia/Phonophobia/Nausea/Vomiting:   Provoked by Physical Activity?:   Triggers:   Associated Symptoms:   Autonomic Signs (such as ptosis, miosis, conjunctival injection, rhinorrhea, increased lacrimation):   Head Trauma:   Association with Menses:   ED Visits:   Hospitalizations:   Missed Work Days ():   Sleep (hours/night):   Caffeine Intake:   Hydration:   Nutrition:   Exercise:   Analgesic Overuse:     Current Medication Regimen:  -     Medications Tried: Response  Preventive:  -     Rescue:  -     Medications Not Tried:  -     MEDICAL AND SURGICAL HISTORY:  Past Medical History:   Diagnosis Date    Allergic rhinitis     Kidney stone     Moderate episode of recurrent major depressive disorder (HCC)     Osteopenia 11/08/2019    Sleep apnea      Past Surgical History:   Procedure Laterality Date    WI REPAIR NON/MALUNION METATARSAL Left 11/8/2023    Procedure: LEFT SECOND METATARSAL NONUNION REPAIR;  Surgeon: Getachew Morgan M.D.;  Location: [facility];  Service: Orthopedics    PB FUSION FOOT BONE,MIDTARSAL,1 JT Left 11/8/2023    Procedure:  POSSIBLE LEFT SECOND TARSOMETATARSAL FUSION, REPAIRS AS INDICATED;  Surgeon: Getachew Morgan M.D.;  Location: [facility];  Service: Orthopedics    NEPHROLITHOTOMY      SINUSCOPE      TONSILLECTOMY      TONSILLECTOMY AND ADENOIDECTOMY       MEDICATIONS:  Current Outpatient Medications   Medication Sig    omeprazole (PRILOSEC) 20 MG delayed-release capsule Take 1 Capsule by mouth every day.    sertraline (ZOLOFT) 25 MG tablet Take 1 Tablet by mouth every evening.    oxybutynin SR (DITROPAN-XL) 10 MG CR tablet Take 1 Tablet by mouth every day.     SOCIAL HISTORY:  Social History     Tobacco Use    Smoking status: Never    Smokeless tobacco: Never   Substance Use Topics    Alcohol use: Yes     Comment: 2 glasses wine 3 d/wk     Social History     Social History Narrative    Not on file     FAMILY HISTORY:  Family History   Problem Relation Age of Onset    Cancer Father         prostate ca, skin cancer.     Lung Disease Father         pulmonary fibrosis    Other Sister         sleep apnea    Psychiatric Illness Sister         depression       Vitals ***    REVIEW OF SYSTEMS:  A ROS was completed.  Pertinent positives and negatives were included in the HPI, above.  All other systems were reviewed and are negative.    PHYSICAL EXAM:  General/Medical:  - NAD  - hair, skin, nails, and joints were normal    Neuro:  MENTAL STATUS: awake and alert; no deficits of speech or language; oriented to person, place, and time; affect was appropriate to situation    CRANIAL NERVES:    II: acuity: J***/J***, fields: intact to confrontation, pupils: 3/3 to 2/2 without a relative afferent pupillary defect, discs: sharp, no red desaturation noted    III/IV/VI: versions: intact without nystagmus    V: facial sensation: symmetric to light touch    VII: facial expression: symmetric    VIII: hearing: intact to finger rub    IX/X: palate: elevates symmetrically    XI: shoulder shrug: symmetric    XII: tongue:  "midline    MOTOR:  - bulk: normal throughout  - tone: normal throughout  Upper Extremity Strength  (R/L)    5/5   Elbow flexion 5/5   Elbow extension 5/5   Shoulder abduction 5/5     Lower Extremity Strength  (R/L)   Hip flexion 5/5   Knee extension 5/5   Knee flexion 5/5   Ankle plantarflexion 5/5   Ankle dorsiflexion 5/5     - pronator drift: absent  - abnormal movements: none    SENSATION:  - light touch: ***  - vibration (R/L, seconds): ***/*** at the great toes  - temperature:***  - pinprick: ***  - proprioception: ***  - Romberg: absent    COORDINATION:  - finger to nose: normal, no ataxia on exam  - finger tapping: rapid and accurate, bilaterally  - foot tapping:***  - foot inversion/ eversion: ***  - clonus:***    REFLEXES:  Reflex Right Left   BR 2+ 2+   Biceps 2+ 2+   Triceps 2+ 2+   Patellae 2+ 2+   Achilles 2+ 2+   Toes down down     GAIT:  - narrow base and normal  - heel-walk:   - toe-walk:   - tandem gait: intact    REVIEW OF IMAGING STUDIES: I reviewed the following studies:  MRI Brain:  Date: N/A  W/o and w/ contrast?:  N/A  Indication: \"N/A\"  Comparison: N/A  Impression:  \"N/A\"    REVIEW OF LABORATORY STUDIES:  No current pertinent labs    ASSESSMENT& PLAN:      Signed: FRANCY Jo    "

## 2025-02-13 ENCOUNTER — APPOINTMENT (OUTPATIENT)
Dept: NEUROLOGY | Facility: MEDICAL CENTER | Age: 70
End: 2025-02-13
Payer: MEDICARE

## 2025-03-20 ENCOUNTER — PATIENT MESSAGE (OUTPATIENT)
Dept: HEALTH INFORMATION MANAGEMENT | Facility: OTHER | Age: 70
End: 2025-03-20

## 2025-03-20 DIAGNOSIS — N39.41 URGE INCONTINENCE OF URINE: ICD-10-CM

## 2025-03-20 RX ORDER — OXYBUTYNIN CHLORIDE 10 MG/1
10 TABLET, EXTENDED RELEASE ORAL DAILY
Qty: 100 TABLET | Refills: 0 | Status: SHIPPED | OUTPATIENT
Start: 2025-03-20

## 2025-03-20 RX ORDER — OXYBUTYNIN CHLORIDE 10 MG/1
10 TABLET, EXTENDED RELEASE ORAL DAILY
Qty: 90 TABLET | Refills: 0 | Status: SHIPPED | OUTPATIENT
Start: 2025-03-20

## 2025-03-20 NOTE — TELEPHONE ENCOUNTER
Received request via: Patient    Was the patient seen in the last year in this department? Yes    Does the patient have an active prescription (recently filled or refills available) for medication(s) requested? No    Pharmacy Name: [facility] [id number]    Does the patient have longterm Plus and need 100-day supply? (This applies to ALL medications) Yes, quantity updated to 100 days

## 2025-04-09 ENCOUNTER — TELEPHONE (OUTPATIENT)
Dept: HEALTH INFORMATION MANAGEMENT | Facility: OTHER | Age: 70
End: 2025-04-09
Payer: MEDICARE

## 2025-04-15 DIAGNOSIS — K21.9 GASTROESOPHAGEAL REFLUX DISEASE, UNSPECIFIED WHETHER ESOPHAGITIS PRESENT: ICD-10-CM

## 2025-04-16 ENCOUNTER — APPOINTMENT (OUTPATIENT)
Dept: NEUROLOGY | Facility: MEDICAL CENTER | Age: 70
End: 2025-04-16
Payer: MEDICARE

## 2025-04-16 RX ORDER — OMEPRAZOLE 20 MG/1
20 CAPSULE, DELAYED RELEASE ORAL DAILY
Qty: 30 CAPSULE | Refills: 5 | Status: SHIPPED | OUTPATIENT
Start: 2025-04-16

## 2025-05-01 PROBLEM — E66.9 OBESITY (BMI 30-39.9): Status: RESOLVED | Noted: 2021-02-22 | Resolved: 2025-05-01

## 2025-05-07 ASSESSMENT — PATIENT HEALTH QUESTIONNAIRE - PHQ9
1. LITTLE INTEREST OR PLEASURE IN DOING THINGS: NOT AT ALL
2. FEELING DOWN, DEPRESSED, IRRITABLE, OR HOPELESS: NOT AT ALL

## 2025-05-07 ASSESSMENT — ACTIVITIES OF DAILY LIVING (ADL): BATHING_REQUIRES_ASSISTANCE: 0

## 2025-05-07 ASSESSMENT — ENCOUNTER SYMPTOMS: GENERAL WELL-BEING: GOOD

## 2025-05-07 NOTE — ASSESSMENT & PLAN NOTE
Chronic, stable. PHQ-9 in office today is 0. She has stopped taking her zoloft. She feels her mood and anxiety is stable. Her mother has Alzheimer's and this causes her some degree of stress. Follow up with PCP as needed.

## 2025-05-07 NOTE — ASSESSMENT & PLAN NOTE
Chronic, stable.  She is not currently taking any lipid lowering medications. We discussed her dietary/lifestyle regimen. Follow up with PCP for continued monitoring and management.  Lab Results   Component Value Date/Time    CHOLSTRLTOT 192 03/14/2024 08:18 AM    TRIGLYCERIDE 54 03/14/2024 08:18 AM    HDL 81 03/14/2024 08:18 AM     (H) 03/14/2024 08:18 AM

## 2025-05-07 NOTE — ASSESSMENT & PLAN NOTE
Chronic, stable. Last DEXA 2023. She has new DEXA scheduled for later this month. She does take calcium and vitamin D supplementation. Does engage in weightbearing exercise. No hx of fragility fractures. Continue to follow up with PCP as previously scheduled.

## 2025-05-08 ENCOUNTER — OFFICE VISIT (OUTPATIENT)
Dept: FAMILY PLANNING/WOMEN'S HEALTH CLINIC | Facility: PHYSICIAN GROUP | Age: 70
End: 2025-05-08
Payer: MEDICARE

## 2025-05-08 VITALS
WEIGHT: 182 LBS | DIASTOLIC BLOOD PRESSURE: 66 MMHG | HEIGHT: 64 IN | OXYGEN SATURATION: 96 % | BODY MASS INDEX: 31.07 KG/M2 | SYSTOLIC BLOOD PRESSURE: 108 MMHG | HEART RATE: 61 BPM

## 2025-05-08 DIAGNOSIS — M85.89 OSTEOPENIA OF MULTIPLE SITES: ICD-10-CM

## 2025-05-08 DIAGNOSIS — E78.00 PURE HYPERCHOLESTEROLEMIA: ICD-10-CM

## 2025-05-08 DIAGNOSIS — F41.1 GAD (GENERALIZED ANXIETY DISORDER): ICD-10-CM

## 2025-05-08 DIAGNOSIS — F33.1 MAJOR DEPRESSIVE DISORDER, RECURRENT, MODERATE (HCC): ICD-10-CM

## 2025-05-08 DIAGNOSIS — G47.33 OSA (OBSTRUCTIVE SLEEP APNEA): ICD-10-CM

## 2025-05-08 PROCEDURE — 3074F SYST BP LT 130 MM HG: CPT

## 2025-05-08 PROCEDURE — 3078F DIAST BP <80 MM HG: CPT

## 2025-05-08 PROCEDURE — 1126F AMNT PAIN NOTED NONE PRSNT: CPT

## 2025-05-08 PROCEDURE — G0439 PPPS, SUBSEQ VISIT: HCPCS

## 2025-05-08 SDOH — ECONOMIC STABILITY: FOOD INSECURITY: WITHIN THE PAST 12 MONTHS, THE FOOD YOU BOUGHT JUST DIDN'T LAST AND YOU DIDN'T HAVE MONEY TO GET MORE.: NEVER TRUE

## 2025-05-08 SDOH — ECONOMIC STABILITY: HOUSING INSECURITY: IN THE LAST 12 MONTHS, WAS THERE A TIME WHEN YOU WERE NOT ABLE TO PAY THE MORTGAGE OR RENT ON TIME?: NO

## 2025-05-08 SDOH — ECONOMIC STABILITY: FOOD INSECURITY: WITHIN THE PAST 12 MONTHS, YOU WORRIED THAT YOUR FOOD WOULD RUN OUT BEFORE YOU GOT THE MONEY TO BUY MORE.: NEVER TRUE

## 2025-05-08 SDOH — ECONOMIC STABILITY: HOUSING INSECURITY: IN THE PAST 12 MONTHS, HOW MANY TIMES HAVE YOU MOVED WHERE YOU WERE LIVING?: 1

## 2025-05-08 SDOH — ECONOMIC STABILITY: FOOD INSECURITY: HOW HARD IS IT FOR YOU TO PAY FOR THE VERY BASICS LIKE FOOD, HOUSING, MEDICAL CARE, AND HEATING?: NOT HARD AT ALL

## 2025-05-08 SDOH — ECONOMIC STABILITY: HOUSING INSECURITY: AT ANY TIME IN THE PAST 12 MONTHS, WERE YOU HOMELESS OR LIVING IN A SHELTER (INCLUDING NOW)?: NO

## 2025-05-08 SDOH — ECONOMIC STABILITY: TRANSPORTATION INSECURITY: IN THE PAST 12 MONTHS, HAS LACK OF TRANSPORTATION KEPT YOU FROM MEDICAL APPOINTMENTS OR FROM GETTING MEDICATIONS?: NO

## 2025-05-08 ASSESSMENT — ACTIVITIES OF DAILY LIVING (ADL): LACK_OF_TRANSPORTATION: NO

## 2025-05-08 ASSESSMENT — FIBROSIS 4 INDEX: FIB4 SCORE: 1.13

## 2025-05-08 ASSESSMENT — PATIENT HEALTH QUESTIONNAIRE - PHQ9: CLINICAL INTERPRETATION OF PHQ2 SCORE: 0

## 2025-05-08 ASSESSMENT — PAIN SCALES - GENERAL: PAINLEVEL_OUTOF10: NO PAIN

## 2025-05-08 NOTE — PROGRESS NOTES
Comprehensive Health Assessment Program     Mey Araujo is a 70 y.o. here for her comprehensive health assessment.    Patient Active Problem List    Diagnosis Date Noted    Pain of left foot 10/24/2023    Treatment-emergent central sleep apnea 10/16/2023    Subacute cough 08/23/2023    Urge incontinence of urine 07/24/2023    Left foot pain 02/03/2023    Urinary incontinence 01/25/2023    Major depressive disorder, recurrent, moderate (HCC) 09/30/2022    RICHARD (generalized anxiety disorder) 09/30/2022    BMI 29.0-29.9,adult 04/11/2022    Overweight with body mass index (BMI) 25.0-29.9 04/11/2022    Syncope 04/11/2022    Dizziness 10/28/2021    Pure hypercholesterolemia 02/22/2021    Plantar fasciitis 02/22/2021    DONN (obstructive sleep apnea) 05/12/2020    Non-smoker 05/12/2020    Nocturnal hypoxia 12/06/2019    Vitamin D insufficiency 12/06/2019    Snoring 11/08/2019    Finger pain, left 11/08/2019    Swelling of finger joint of left hand 11/08/2019    Osteopenia of multiple sites 11/08/2019    Preventative health care 04/06/2016    History of kidney stones 04/06/2016    Diarrhea of presumed infectious origin 03/12/2016    Headache 03/10/2016    History of pneumonia 03/10/2016       Current Outpatient Medications   Medication Sig Dispense Refill    omeprazole (PRILOSEC) 20 MG delayed-release capsule TAKE 1 CAPSULE BY MOUTH DAILY 30 Capsule 5    oxybutynin SR (DITROPAN-XL) 10 MG CR tablet TAKE 1 TABLET BY MOUTH DAILY 90 Tablet 0    oxybutynin SR (DITROPAN-XL) 10 MG CR tablet Take 1 Tablet by mouth every day. 100 Tablet 0    sertraline (ZOLOFT) 25 MG tablet Take 1 Tablet by mouth every evening. (Patient not taking: Reported on 5/8/2025) 90 Tablet 3     No current facility-administered medications for this visit.          Current supplements as per medication list.     Allergies:   Pcn [penicillins], Avocado, and Hydrocodone  Social History     Tobacco Use    Smoking status: Never    Smokeless tobacco:  Never   Vaping Use    Vaping status: Never Used   Substance Use Topics    Alcohol use: Yes     Comment: 2 glasses wine 3 d/wk    Drug use: No     Family History   Problem Relation Age of Onset    Cancer Father         prostate ca, skin cancer.     Lung Disease Father         pulmonary fibrosis    Other Sister         sleep apnea    Psychiatric Illness Sister         mariella Mathias  has a past medical history of Allergic rhinitis, Kidney stone, Moderate episode of recurrent major depressive disorder (HCC), Osteopenia (11/08/2019), and Sleep apnea.   Past Surgical History:   Procedure Laterality Date    AR REPAIR NON/MALUNION METATARSAL Left 11/8/2023    Procedure: LEFT SECOND METATARSAL NONUNION REPAIR;  Surgeon: Getachew Morgan M.D.;  Location: [facility] [place];  Service: Orthopedics    PB FUSION FOOT BONE,MIDTARSAL,1 JT Left 11/8/2023    Procedure: POSSIBLE LEFT SECOND TARSOMETATARSAL FUSION, REPAIRS AS INDICATED;  Surgeon: Getachew Morgan M.D.;  Location: [facility];  Service: Orthopedics    NEPHROLITHOTOMY      SINUSCOPE      TONSILLECTOMY      TONSILLECTOMY AND ADENOIDECTOMY         Screening:  In the last six months have you experienced any leakage of urine? Yes,is taking medication for it. Wears a pad.     Depression Screening  Little interest or pleasure in doing things?  0 - not at all  Feeling down, depressed , or hopeless? 0 - not at all  Trouble falling or staying asleep, or sleeping too much?     Feeling tired or having little energy?     Poor appetite or overeating?     Feeling bad about yourself - or that you are a failure or have let yourself or your family down?    Trouble concentrating on things, such as reading the newspaper or watching television?    Moving or speaking so slowly that other people could have noticed.  Or the opposite - being so fidgety or restless that you have been moving around a lot more than usual?     Thoughts that you would be  better off dead, or of hurting yourself?     Patient Health Questionnaire Score:      If depressive symptoms identified deferred to follow up visit unless specifically addressed in assessment and plan.    Interpretation of PHQ-9 Total Score   Score Severity   1-4 No Depression   5-9 Mild Depression   10-14 Moderate Depression   15-19 Moderately Severe Depression   20-27 Severe Depression    Screening for Cognitive Impairment  Do you or any of your friends or family members have any concern about your memory? No  Three Minute Recall (Village, Kitchen, Baby) 3/3    Robert clock face with all 12 numbers and set the hands to show 10 minutes past 11.  Yes    Cognitive concerns identified deferred for follow up unless specifically addressed in assessment and plan.    Fall Risk Assessment  Has the patient had two or more falls in the last year or any fall with injury in the last year?  No she rides a SUP which challenges her balance.    Safety Assessment  Do you always wear your seatbelt?  Yes  Any changes to home needed to function safely? No  Difficulty hearing.  No  Patient counseled about all safety risks that were identified.    Functional Assessment ADLs  Are there any barriers preventing you from cooking for yourself or meeting nutritional needs?  No.    Are there any barriers preventing you from driving safely or obtaining transportation?  No.    Are there any barriers preventing you from using a telephone or calling for help?  No    Are there any barriers preventing you from shopping?  No.    Are there any barriers preventing you from taking care of your own finances?  No    Are there any barriers preventing you from managing your medications?  No    Are there any barriers preventing you from showering, bathing or dressing yourself? No    Are there any barriers preventing you from doing housework or laundry? No    Are there any barriers preventing you from using the toilet?No    Are you currently engaging in any  exercise or physical activity?  Yes.  Walking.     Self-Assessment of Health  What is your perception of your health? Good    Do you sleep more than six hours a night? Yes    In the past 7 days, how much did pain keep you from doing your normal work? None    Do you spend quality time with family or friends (virtually or in person)? Yes    Do you usually eat a heart healthy diet that constists of a variety of fruits, vegetables, whole grains and fiber? Yes    Do you eat foods high in fat and/or Fast Food more than three times per week? No    How concerned are you that your medical conditions are not being well managed? Not at all    Are you worried that in the next 2 months, you may not have stable housing that you own, rent, or stay in as part of a household? Yes        Advance Care Planning  Do you have an Advance Directive, Living Will, Durable Power of , or POLST? Yes    Living Will Durable Power of    is not on file - instructed patient to bring in a copy to scan into their chart      Health Maintenance Summary            Current Care Gaps       Colorectal Cancer Screening (Colonoscopy - Every 10 Years) Overdue since 6/10/2024      06/10/2014  Colonoscopy (Done)              COVID-19 Vaccine (3 - 2024-25 season) Overdue since 9/1/2024 03/17/2021  Imm Admin: PFIZER PURPLE CAP SARS-COV-2 VACCINATION (12+)    02/13/2021  Imm Admin: PFIZER PURPLE CAP SARS-COV-2 VACCINATION (12+)                      Needs Review       Hepatitis C Screening  Tentatively Complete      11/13/2019  HEP C VIRUS ANTIBODY                      Awaiting Completion       Mammogram (Yearly) Scheduled for 5/21/2025 01/03/2025  Order placed for MA-SCREENING MAMMO BILAT W/TOMOSYNTHESIS W/CAD by Anabella Mao    05/15/2024  MA-SCREENING MAMMO BILAT W/TOMOSYNTHESIS W/CAD    05/10/2023  MA-SCREENING MAMMO BILAT W/TOMOSYNTHESIS W/CAD    02/04/2022  MA-SCREENING MAMMO BILAT W/TOMOSYNTHESIS W/CAD    02/01/2021  MA-SCREENING  MAMMO BILAT W/TOMOSYNTHESIS W/CAD     Only the first 5 history entries have been loaded, but more history exists.            Bone Density Scan (Every 2 Years) Scheduled for 5/29/2025      01/10/2025  Order placed for DS-BONE DENSITY STUDY (DEXA) by FRANCY Gunn    05/10/2023  DS-BONE DENSITY STUDY (DEXA)    01/30/2020  DS-BONE DENSITY STUDY (DEXA)    04/20/2007  DS-BONE DENSITY STUDY (DEXA)                      Upcoming       Annual Wellness Visit (Yearly) Next due on 5/8/2026 05/08/2025  Level of Service: [place] ANNUAL WELLNESS VISIT-INCLUDES PPPS SUBSEQUE*    02/28/2024  Level of Service: [place] ANNUAL WELLNESS VISIT-INCLUDES PPPS SUBSEQUE*    01/25/2023  Level of Service: [place] ANNUAL WELLNESS VISIT-INCLUDES PPPS SUBSEQUE*    04/11/2022  Level of Service: [place] ANNUAL WELLNESS VISIT-INCLUDES PPPS SUBSEQUE*              IMM DTaP/Tdap/Td Vaccine (2 - Td or Tdap) Next due on 12/6/2029 12/06/2019  Imm Admin: Tdap Vaccine                      Completed or No Longer Recommended       Influenza Vaccine (Series Information) Completed      10/24/2024  Imm Admin: Influenza high-dose trivalent (PF)    10/02/2023  Imm Admin: Influenza Vaccine Adult HD    09/26/2022  Imm Admin: Influenza Vaccine Adult HD    10/07/2019  Imm Admin: Influenza Vaccine Quad Inj (Pf)    10/07/2019  Imm Admin: Influenza Vaccine Quad Inj (Preserved)      Only the first 5 history entries have been loaded, but more history exists.              Zoster (Shingles) Vaccines (Series Information) Completed      01/31/2020  Imm Admin: Zoster Vaccine Recombinant (RZV) (SHINGRIX)    12/06/2019  Imm Admin: Zoster Vaccine Recombinant (RZV) (SHINGRIX)              Pneumococcal Vaccine: 50+ Years (Series Information) Completed      09/05/2023  Imm Admin: Pneumococcal Conjugate Vaccine (PCV20)    08/23/2023  Imm Admin: Pneumococcal Conjugate Vaccine (PCV20)    04/07/2022  Imm Admin: Pneumococcal polysaccharide vaccine (PPSV-23)              Hepatitis A  "Vaccine (Hep A) (Series Information) Aged Out      No completion history exists for this topic.              Hepatitis B Vaccine (Hep B) (Series Information) Aged Out     No completion history exists for this topic.              HPV Vaccines (Series Information) Aged Out     No completion history exists for this topic.              Polio Vaccine (Inactivated Polio) (Series Information) Aged Out     No completion history exists for this topic.              Meningococcal Immunization (Series Information) Aged Out     No completion history exists for this topic.              Cervical Cancer Screening  Discontinued        Frequency changed to Never automatically (Topic No Longer Applies)    05/20/2020  PAP IG (IMAGE GUIDED)    06/06/2014  Done                            Patient Care Team:  FRANCY Gunn as PCP - General (Family Medicine)  FRANCY Gunn as PCP - HTH Paneled (Family Medicine)  Preferred home care  as Respiratory Therapist (DME Supplier)    Financial Resource Strain: Low Risk  (5/8/2025)    Overall Financial Resource Strain (CARDIA)     Difficulty of Paying Living Expenses: Not hard at all      Transportation Needs: No Transportation Needs (5/8/2025)    PRAPARE - Transportation     Lack of Transportation (Medical): No     Lack of Transportation (Non-Medical): No      Food Insecurity: No Food Insecurity (5/8/2025)    Hunger Vital Sign     Worried About Running Out of Food in the Last Year: Never true     Ran Out of Food in the Last Year: Never true        Encounter Vitals  Blood Pressure : 108/66  Pulse: 61  Pulse Oximetry: 96 %  Weight: 82.6 kg (182 lb)  Height: 162.6 cm (5' 4\")  BMI (Calculated): 31.24  Pain Score: No pain     Physical Exam  Constitutional:       General: She is not in acute distress.     Appearance: Normal appearance.   HENT:      Head: Normocephalic and atraumatic.   Eyes:      Extraocular Movements: Extraocular movements intact.      Pupils: Pupils are equal, " round, and reactive to light.   Cardiovascular:      Rate and Rhythm: Normal rate and regular rhythm.      Heart sounds: Normal heart sounds.   Pulmonary:      Breath sounds: Normal breath sounds.   Musculoskeletal:      Right lower leg: No edema.      Left lower leg: No edema.   Neurological:      Mental Status: She is alert and oriented to person, place, and time.   Psychiatric:         Mood and Affect: Mood normal.         Behavior: Behavior normal.       Assessment and Plan. The following treatment and monitoring plan is recommended:  RICHARD (generalized anxiety disorder)  Major depressive disorder, recurrent, moderate (HCC)  Chronic, stable. PHQ-9 in office today is 0. She has stopped taking her zoloft. She feels her mood and anxiety is stable. Her mother has Alzheimer's and this causes her some degree of stress. Follow up with PCP as needed.    DONN (obstructive sleep apnea)  Chronic, stable. Compliant with CPAP at night. Does not require supplemental O2. Follows with pulmonology.     Osteopenia of multiple sites  Chronic, stable. Last DEXA 2023. She has new DEXA scheduled for later this month. She does take calcium and vitamin D supplementation. Does engage in weightbearing exercise. No hx of fragility fractures. Continue to follow up with PCP as previously scheduled.    Pure hypercholesterolemia  Chronic, stable.  She is not currently taking any lipid lowering medications. We discussed her dietary/lifestyle regimen. Follow up with PCP for continued monitoring and management.  Lab Results   Component Value Date/Time    CHOLSTRLTOT 192 03/14/2024 08:18 AM    TRIGLYCERIDE 54 03/14/2024 08:18 AM    HDL 81 03/14/2024 08:18 AM     (H) 03/14/2024 08:18 AM       Services suggested: No services needed at this time  Health Care Screening: Age-appropriate preventive services recommended by USPTF and ACIP covered by Medicare were discussed today. Services ordered if indicated and agreed upon by the  patient.  Referrals offered: Community-based lifestyle interventions to reduce health risks and promote self-management and wellness, fall prevention, nutrition, physical activity, tobacco-use cessation, weight loss, and mental health services as per orders if indicated.    Discussion today about general wellness and lifestyle habits:    Prevent falls and reduce trip hazards; Cautioned about securing or removing rugs.  Have a working fire alarm and carbon monoxide detector.  Engage in regular physical activity and social activities.    Follow-up: Return for appointment with Primary Care Provider as needed..

## 2025-05-09 NOTE — Clinical Note
[facility]  [address] [place] [address]    [id number]    Mey Araujo  [address] [place] [address]    May 9, 2025    Member Name: Mey Araujo   Member Number: [id number]   Reference Number: [id number]   Approved Services: Echos and EKG   Approved Service Dates: 05/07/2025 - 08/05/2025   Requesting Provider: Joslyn Lambert   Requested Provider: [facility]     Dear Mey Araujo:    The following medical service(s) requested by Joslyn Lambert have been approved:    Procedure Code Procedure Code Name Requested Quantity Approved Quantity Status   95924 (CPT®) DE ECHO HEART XTHORACIC,COMPLETE W DOPPLER 1 1 Authorized       Approved Quantity means the number of visits approved for medication treatments and/or medical services.    The services should be provided by [facility] no later than 08/05/2025. Please contact the provider listed below with any questions.     Provider Information:  [facility]  [phone]    Your plan benefit may require a deductible, co-payment or coinsurance for these services. This authorization does not guarantee [facility] will pay the claim for services that you receive. Payment by [facility] for these services is subject to the terms of your Evidence of Coverage, your eligibility at the time of service, and confirmation of benefit coverage.    For any questions or additional information, please contact Customer Service:    [facility] Toll Free: [phone]  [employer] users dial: [phone]   Call Center Hours:  Oct 1 - Mar 31, Mon - Fri 7 AM to 8 PM PST  Oct 1 - Mar 31, Sat - Sun 8 AM to 8 PM PST  Apr 1 - Sep 30, Mon - Fri 7 AM to 8 PM PST   Office Hours: Mon - Fri 8 AM to 5 PM PST   E-mail: [email or web address]   Website:  [email or web address]      This information is available for free in other languages. Please contact Customer Service at the  phone number above for more information. [facility] complies with applicable Federal civil rights laws and does not discriminate on the basis of race, color, national origin, age, disability or sex.    Sincerely,     Healthcare Utilization Management Department     Cc: [facility]    Multi-Language Insert  Multi- Services  English: We have free  services to answer any questions you may have about our health or drug plan.  To get an , just call us at [phone].  Someone who speaks English/Language can help you.  This is a free service.  Armenian: Tenemos servicios de intérprete sin costo alguno  para responder cualquier pregunta que pueda tener sobre nuestro plan de chevy o medicamentos. Para hablar con un intérprete, por favor llame al [phone]. Alguien que hable español le podrá ayudar. Carmen es un servicio gratuito.  Chinese Mandarin: ?????????????????????????????? ???????????????? [phone]????????????????? ?????????  Chinese Cantonese: ?????????????????????????????? ????????????? [phone]???????????????????? ????????  Tagalog:  Cristiane redd serbisyo sa millssasaling-poncho taylornggil sa sarah bermeo o panggamot.  Mookie charlton tagasalingmateo berumen sa [phone]. Cande charlton Tagalog.  Morgan morales.  Turkish:  Nous proposons brittany services gratuits d'interprétation pour répondre à toutes kena questions relatives à notre régime de santé ou d'assurance-médicaments. Pour accéder au service d'interprétation, il vous suffit de nous appeler au [phone]. Un interlocuteur parlant Français pourra vous aider. Ce service est gratuit.  [place] citizen:  Nathalie aleman có d?ch v? thông d?ch mi?n phí ð? tr? l?i các câu h?i v? chýõng s?c kh?e và chýõng trình thu?c men. N?u quí v? c?n thông d?ch viên  vilma g?i [phone] s? có nhân viên nói ti?ng Vi?t giúp ð? quí v?. Ðây là d?ch v? mi?n phí .  Peruvian:  Unser kostenser Dolmetscherservice beantwortet Ihren Fragen zu unserem Gesundheits- und Arzneimittelplan. Unsere Dolmetscher erreichen Sie [phone]. Man wird Ihnen uma auf [facility]. Dieser Service ist [facility].  Kyrgyz:  ??? ?? ?? ?? ?? ??? ?? ??? ?? ???? ?? ?? ???? ???? ????. ?? ???? ????? ?? [phone] ??? ??? ????.  ???? ?? ???? ?? ?? ????. ? ???? ??? ?????.   [place] Islander: Åñëè ó âàñ âîçíèêíóò âîïðîñû îòíîñèòåëüíî ñòðàõîâîãî èëè ìåäèêàìåíòíîãî ïëàíà, âû ìîæåòå âîñïîëüçîâàòüñÿ íàøèìè áåñïëàòíûìè óñëóãàìè ïåðåâîä÷èêîâ. ×òîáû âîñïîëüçîâàòüñÿ óñëóãàìè ïåðåâîä÷èêà, ïîçâîíèòå íàì ïî òåëåôîíó [phone]. Âàì îêàæåò ïîìîùü ñîòðóäíèê, êîòîðûé ãîâîðèò ïî-póññêè. Äàííàÿ óñëóãà áåñïëàòíàÿ.  Nepali: ÅääÇ äÞÏã ÎÏãÇÊ ÇáãÊÑÌã ÇáÝæÑí ÇáãÌÇäíÉ ááÅÌÇÈÉ Úä Ãí ÃÓÆáÉ ÊÊÚáÞ ÈÇáÕÍÉ Ãæ ÌÏæá ÇáÃÏæíÉ áÏíäÇ. ááÍÕæá Úáì ãÊÑÌã ÝæÑí¡ áíÓ Úáíß Óæì ÇáÇÊÕÇá ÈäÇ Úáì [phone] . ÓíÞæã ÔÎÕ ãÇ íÊÍÏË ÇáÚÑÈíÉ ÈãÓÇÚÏÊß. åÐå ÎÏãÉ ãÌÇäíÉ.  Kentrell: ????? ????????? ?? ??? ?? ????? ?? ???? ??? ???? ???? ?? ?????? ?? ???? ???? ?? ??? ????? ??? ????? ???????? ?????? ?????? ???. ?? ???????? ??????? ???? ?? ???, ?? ???? [phone] ?? ??? ????. ??? ??????? ?? ?????? ????? ?? ???? ??? ?? ???? ??. ?? ?? ????? ???? ??.   South African:  È disponibile un servizio di interpretariato gratuito per rispondere a eventuali domande sul nostro piano sanitario e farmaceutico. Per un interprete, contattare il miguel a [phone]. Un nostro incaricato chuckie parla Italianovi fornirà l'assistenza necessaria. È un servizio gratuito.  Portugués:  Dispomos de serviços de interpretação gratuitos para responder a qualquer questão que tenha acerca do nosso plano de saúde ou de medicação. Para obter um intérprete, contacte-nos através do número [phone]. Irá encontrar alguém que fale o idioma  Português para o ajudar. Carmen serviço é  gratuito.  Sinhala Creole:  Nou genyen sèvis entèprèt gratis yna reponn tout kesyon ou ta genyen konsènan plan medikal oswa dwòg nou an.  Nya jwenn yon entèprèt, jis rele nou nan [phone]. Yon moun ki pale Kreyòl kapab jillian w.  Sa a se yon sèvis ki gratis.  Polish:  Umo¿liwiamy bezp³atne skorzystanie z us³ug t³umacza ustnego, który pomo¿e w uzyskaniu odpowiedzi na temat planu zdrowotnego lub dawkowania carinaw. Avani skorzystaæ z pomocy t³umacza znaj¹cego belinda roe¿y zadzwoniæ pod numer [phone]. Ta us³uga jest bezp³atna.  Libyan: ????? ??????? ????????????????????? ??????????????????????????????????[phone] ???????????????? ? ????????????????? ?????

## 2025-05-14 ENCOUNTER — HOSPITAL ENCOUNTER (OUTPATIENT)
Dept: LAB | Facility: MEDICAL CENTER | Age: 70
End: 2025-05-14
Attending: NURSE PRACTITIONER
Payer: MEDICARE

## 2025-05-14 ENCOUNTER — HOSPITAL ENCOUNTER (OUTPATIENT)
Dept: CARDIOLOGY | Facility: MEDICAL CENTER | Age: 70
End: 2025-05-14
Payer: MEDICARE

## 2025-05-14 DIAGNOSIS — Z13.29 SCREENING FOR THYROID DISORDER: ICD-10-CM

## 2025-05-14 DIAGNOSIS — Z13.0 ENCOUNTER FOR SCREENING FOR HEMATOLOGIC DISORDER: ICD-10-CM

## 2025-05-14 DIAGNOSIS — Z13.228 ENCOUNTER FOR SCREENING FOR METABOLIC DISORDER: ICD-10-CM

## 2025-05-14 DIAGNOSIS — E55.9 VITAMIN D INSUFFICIENCY: ICD-10-CM

## 2025-05-14 DIAGNOSIS — E78.00 PURE HYPERCHOLESTEROLEMIA: ICD-10-CM

## 2025-05-14 DIAGNOSIS — M85.89 OSTEOPENIA OF MULTIPLE SITES: ICD-10-CM

## 2025-05-14 DIAGNOSIS — R01.1 HEART MURMUR: ICD-10-CM

## 2025-05-14 LAB
25(OH)D3 SERPL-MCNC: 48 NG/ML (ref 30–100)
ALBUMIN SERPL BCP-MCNC: 4 G/DL (ref 3.2–4.9)
ALBUMIN/GLOB SERPL: 1.4 G/DL
ALP SERPL-CCNC: 77 U/L (ref 30–99)
ALT SERPL-CCNC: 21 U/L (ref 2–50)
ANION GAP SERPL CALC-SCNC: 8 MMOL/L (ref 7–16)
AST SERPL-CCNC: 23 U/L (ref 12–45)
BASOPHILS # BLD AUTO: 1.2 % (ref 0–1.8)
BASOPHILS # BLD: 0.06 K/UL (ref 0–0.12)
BILIRUB SERPL-MCNC: 0.4 MG/DL (ref 0.1–1.5)
BUN SERPL-MCNC: 19 MG/DL (ref 8–22)
CALCIUM ALBUM COR SERPL-MCNC: 9.2 MG/DL (ref 8.5–10.5)
CALCIUM SERPL-MCNC: 9.2 MG/DL (ref 8.5–10.5)
CHLORIDE SERPL-SCNC: 109 MMOL/L (ref 96–112)
CHOLEST SERPL-MCNC: 187 MG/DL (ref 100–199)
CO2 SERPL-SCNC: 26 MMOL/L (ref 20–33)
CREAT SERPL-MCNC: 0.78 MG/DL (ref 0.5–1.4)
EOSINOPHIL # BLD AUTO: 0.39 K/UL (ref 0–0.51)
EOSINOPHIL NFR BLD: 8 % (ref 0–6.9)
ERYTHROCYTE [DISTWIDTH] IN BLOOD BY AUTOMATED COUNT: 49.8 FL (ref 35.9–50)
GFR SERPLBLD CREATININE-BSD FMLA CKD-EPI: 83 ML/MIN/1.73 M 2
GLOBULIN SER CALC-MCNC: 2.9 G/DL (ref 1.9–3.5)
GLUCOSE SERPL-MCNC: 95 MG/DL (ref 65–99)
HCT VFR BLD AUTO: 41.2 % (ref 37–47)
HDLC SERPL-MCNC: 68 MG/DL
HGB BLD-MCNC: 13.2 G/DL (ref 12–16)
IMM GRANULOCYTES # BLD AUTO: 0.01 K/UL (ref 0–0.11)
IMM GRANULOCYTES NFR BLD AUTO: 0.2 % (ref 0–0.9)
LDLC SERPL CALC-MCNC: 101 MG/DL
LV EJECT FRACT  99904: 65
LV EJECT FRACT MOD 2C 99903: 78.19
LV EJECT FRACT MOD 4C 99902: 62.78
LV EJECT FRACT MOD BP 99901: 70.54
LYMPHOCYTES # BLD AUTO: 2.03 K/UL (ref 1–4.8)
LYMPHOCYTES NFR BLD: 41.9 % (ref 22–41)
MCH RBC QN AUTO: 28.6 PG (ref 27–33)
MCHC RBC AUTO-ENTMCNC: 32 G/DL (ref 32.2–35.5)
MCV RBC AUTO: 89.4 FL (ref 81.4–97.8)
MONOCYTES # BLD AUTO: 0.41 K/UL (ref 0–0.85)
MONOCYTES NFR BLD AUTO: 8.5 % (ref 0–13.4)
NEUTROPHILS # BLD AUTO: 1.95 K/UL (ref 1.82–7.42)
NEUTROPHILS NFR BLD: 40.2 % (ref 44–72)
NRBC # BLD AUTO: 0 K/UL
NRBC BLD-RTO: 0 /100 WBC (ref 0–0.2)
PLATELET # BLD AUTO: 263 K/UL (ref 164–446)
PMV BLD AUTO: 11.4 FL (ref 9–12.9)
POTASSIUM SERPL-SCNC: 4.8 MMOL/L (ref 3.6–5.5)
PROT SERPL-MCNC: 6.9 G/DL (ref 6–8.2)
RBC # BLD AUTO: 4.61 M/UL (ref 4.2–5.4)
SODIUM SERPL-SCNC: 143 MMOL/L (ref 135–145)
TRIGL SERPL-MCNC: 88 MG/DL (ref 0–149)
TSH SERPL DL<=0.005 MIU/L-ACNC: 3.55 UIU/ML (ref 0.38–5.33)
WBC # BLD AUTO: 4.9 K/UL (ref 4.8–10.8)

## 2025-05-14 PROCEDURE — 84443 ASSAY THYROID STIM HORMONE: CPT

## 2025-05-14 PROCEDURE — 82306 VITAMIN D 25 HYDROXY: CPT

## 2025-05-14 PROCEDURE — 80061 LIPID PANEL: CPT

## 2025-05-14 PROCEDURE — 93306 TTE W/DOPPLER COMPLETE: CPT | Mod: 26 | Performed by: INTERNAL MEDICINE

## 2025-05-14 PROCEDURE — 80053 COMPREHEN METABOLIC PANEL: CPT

## 2025-05-14 PROCEDURE — 36415 COLL VENOUS BLD VENIPUNCTURE: CPT

## 2025-05-14 PROCEDURE — 93306 TTE W/DOPPLER COMPLETE: CPT

## 2025-05-14 PROCEDURE — 85025 COMPLETE CBC W/AUTO DIFF WBC: CPT

## 2025-05-21 ENCOUNTER — HOSPITAL ENCOUNTER (OUTPATIENT)
Dept: RADIOLOGY | Facility: MEDICAL CENTER | Age: 70
End: 2025-05-21
Attending: NURSE PRACTITIONER
Payer: MEDICARE

## 2025-05-21 DIAGNOSIS — Z12.31 VISIT FOR SCREENING MAMMOGRAM: ICD-10-CM

## 2025-05-21 PROCEDURE — 77067 SCR MAMMO BI INCL CAD: CPT

## 2025-05-23 ENCOUNTER — APPOINTMENT (OUTPATIENT)
Dept: CARDIOLOGY | Facility: MEDICAL CENTER | Age: 70
End: 2025-05-23
Payer: MEDICARE

## 2025-05-27 SDOH — ECONOMIC STABILITY: FOOD INSECURITY: WITHIN THE PAST 12 MONTHS, THE FOOD YOU BOUGHT JUST DIDN'T LAST AND YOU DIDN'T HAVE MONEY TO GET MORE.: NEVER TRUE

## 2025-05-27 SDOH — HEALTH STABILITY: PHYSICAL HEALTH: ON AVERAGE, HOW MANY DAYS PER WEEK DO YOU ENGAGE IN MODERATE TO STRENUOUS EXERCISE (LIKE A BRISK WALK)?: 0 DAYS

## 2025-05-27 SDOH — ECONOMIC STABILITY: INCOME INSECURITY: HOW HARD IS IT FOR YOU TO PAY FOR THE VERY BASICS LIKE FOOD, HOUSING, MEDICAL CARE, AND HEATING?: NOT HARD AT ALL

## 2025-05-27 SDOH — ECONOMIC STABILITY: FOOD INSECURITY: WITHIN THE PAST 12 MONTHS, YOU WORRIED THAT YOUR FOOD WOULD RUN OUT BEFORE YOU GOT MONEY TO BUY MORE.: NEVER TRUE

## 2025-05-27 SDOH — ECONOMIC STABILITY: INCOME INSECURITY: IN THE LAST 12 MONTHS, WAS THERE A TIME WHEN YOU WERE NOT ABLE TO PAY THE MORTGAGE OR RENT ON TIME?: NO

## 2025-05-27 SDOH — HEALTH STABILITY: PHYSICAL HEALTH: ON AVERAGE, HOW MANY MINUTES DO YOU ENGAGE IN EXERCISE AT THIS LEVEL?: 0 MIN

## 2025-05-27 SDOH — ECONOMIC STABILITY: TRANSPORTATION INSECURITY
IN THE PAST 12 MONTHS, HAS LACK OF RELIABLE TRANSPORTATION KEPT YOU FROM MEDICAL APPOINTMENTS, MEETINGS, WORK OR FROM GETTING THINGS NEEDED FOR DAILY LIVING?: NO

## 2025-05-27 SDOH — HEALTH STABILITY: MENTAL HEALTH
STRESS IS WHEN SOMEONE FEELS TENSE, NERVOUS, ANXIOUS, OR CAN'T SLEEP AT NIGHT BECAUSE THEIR MIND IS TROUBLED. HOW STRESSED ARE YOU?: VERY MUCH

## 2025-05-27 ASSESSMENT — SOCIAL DETERMINANTS OF HEALTH (SDOH)
IN A TYPICAL WEEK, HOW MANY TIMES DO YOU TALK ON THE PHONE WITH FAMILY, FRIENDS, OR NEIGHBORS?: MORE THAN THREE TIMES A WEEK
IN THE PAST 12 MONTHS, HAS THE ELECTRIC, GAS, OIL, OR WATER COMPANY THREATENED TO SHUT OFF SERVICE IN YOUR HOME?: NO
DO YOU BELONG TO ANY CLUBS OR ORGANIZATIONS SUCH AS CHURCH GROUPS UNIONS, FRATERNAL OR ATHLETIC GROUPS, OR SCHOOL GROUPS?: NO
HOW MANY DRINKS CONTAINING ALCOHOL DO YOU HAVE ON A TYPICAL DAY WHEN YOU ARE DRINKING: 3 OR 4
HOW OFTEN DO YOU ATTEND CHURCH OR RELIGIOUS SERVICES?: 1 TO 4 TIMES PER YEAR
HOW OFTEN DO YOU ATTENT MEETINGS OF THE CLUB OR ORGANIZATION YOU BELONG TO?: NEVER
HOW OFTEN DO YOU HAVE A DRINK CONTAINING ALCOHOL: 2-3 TIMES A WEEK
IN A TYPICAL WEEK, HOW MANY TIMES DO YOU TALK ON THE PHONE WITH FAMILY, FRIENDS, OR NEIGHBORS?: MORE THAN THREE TIMES A WEEK
HOW OFTEN DO YOU ATTENT MEETINGS OF THE CLUB OR ORGANIZATION YOU BELONG TO?: NEVER
HOW OFTEN DO YOU ATTENT MEETINGS OF THE CLUB OR ORGANIZATION YOU BELONG TO?: NEVER
IN A TYPICAL WEEK, HOW MANY TIMES DO YOU TALK ON THE PHONE WITH FAMILY, FRIENDS, OR NEIGHBORS?: MORE THAN THREE TIMES A WEEK
HOW OFTEN DO YOU HAVE SIX OR MORE DRINKS ON ONE OCCASION: NEVER
HOW OFTEN DO YOU ATTENT MEETINGS OF THE CLUB OR ORGANIZATION YOU BELONG TO?: NEVER
IN THE PAST 12 MONTHS, HAS THE ELECTRIC, GAS, OIL, OR WATER COMPANY THREATENED TO SHUT OFF SERVICE IN YOUR HOME?: NO
HOW OFTEN DO YOU ATTEND CHURCH OR RELIGIOUS SERVICES?: 1 TO 4 TIMES PER YEAR
HOW OFTEN DO YOU ATTEND CHURCH OR RELIGIOUS SERVICES?: 1 TO 4 TIMES PER YEAR
HOW OFTEN DO YOU GET TOGETHER WITH FRIENDS OR RELATIVES?: MORE THAN THREE TIMES A WEEK
WITHIN THE PAST 12 MONTHS, YOU WORRIED THAT YOUR FOOD WOULD RUN OUT BEFORE YOU GOT THE MONEY TO BUY MORE: NEVER TRUE
HOW OFTEN DO YOU GET TOGETHER WITH FRIENDS OR RELATIVES?: MORE THAN THREE TIMES A WEEK
IN THE PAST 12 MONTHS, HAS THE ELECTRIC, GAS, OIL, OR WATER COMPANY THREATENED TO SHUT OFF SERVICE IN YOUR HOME?: NO
DO YOU BELONG TO ANY CLUBS OR ORGANIZATIONS SUCH AS CHURCH GROUPS UNIONS, FRATERNAL OR ATHLETIC GROUPS, OR SCHOOL GROUPS?: NO
DO YOU BELONG TO ANY CLUBS OR ORGANIZATIONS SUCH AS CHURCH GROUPS UNIONS, FRATERNAL OR ATHLETIC GROUPS, OR SCHOOL GROUPS?: NO
HOW OFTEN DO YOU ATTEND CHURCH OR RELIGIOUS SERVICES?: 1 TO 4 TIMES PER YEAR
HOW OFTEN DO YOU GET TOGETHER WITH FRIENDS OR RELATIVES?: MORE THAN THREE TIMES A WEEK
HOW HARD IS IT FOR YOU TO PAY FOR THE VERY BASICS LIKE FOOD, HOUSING, MEDICAL CARE, AND HEATING?: NOT HARD AT ALL
HOW OFTEN DO YOU GET TOGETHER WITH FRIENDS OR RELATIVES?: MORE THAN THREE TIMES A WEEK
IN A TYPICAL WEEK, HOW MANY TIMES DO YOU TALK ON THE PHONE WITH FAMILY, FRIENDS, OR NEIGHBORS?: MORE THAN THREE TIMES A WEEK
DO YOU BELONG TO ANY CLUBS OR ORGANIZATIONS SUCH AS CHURCH GROUPS UNIONS, FRATERNAL OR ATHLETIC GROUPS, OR SCHOOL GROUPS?: NO

## 2025-05-27 ASSESSMENT — LIFESTYLE VARIABLES
HOW MANY STANDARD DRINKS CONTAINING ALCOHOL DO YOU HAVE ON A TYPICAL DAY: 3 OR 4
SKIP TO QUESTIONS 9-10: 0
HOW OFTEN DO YOU HAVE SIX OR MORE DRINKS ON ONE OCCASION: NEVER
AUDIT-C TOTAL SCORE: 4
HOW OFTEN DO YOU HAVE A DRINK CONTAINING ALCOHOL: 2-3 TIMES A WEEK
AUDIT-C TOTAL SCORE: 4
HOW OFTEN DO YOU HAVE A DRINK CONTAINING ALCOHOL: 2-3 TIMES A WEEK
SKIP TO QUESTIONS 9-10: 0
SKIP TO QUESTIONS 9-10: 0
HOW OFTEN DO YOU HAVE SIX OR MORE DRINKS ON ONE OCCASION: NEVER
AUDIT-C TOTAL SCORE: 4
HOW OFTEN DO YOU HAVE SIX OR MORE DRINKS ON ONE OCCASION: NEVER
HOW MANY STANDARD DRINKS CONTAINING ALCOHOL DO YOU HAVE ON A TYPICAL DAY: 3 OR 4
HOW OFTEN DO YOU HAVE A DRINK CONTAINING ALCOHOL: 2-3 TIMES A WEEK
HOW MANY STANDARD DRINKS CONTAINING ALCOHOL DO YOU HAVE ON A TYPICAL DAY: 3 OR 4

## 2025-05-28 ENCOUNTER — APPOINTMENT (OUTPATIENT)
Dept: MEDICAL GROUP | Facility: IMAGING CENTER | Age: 70
End: 2025-05-28
Payer: MEDICARE

## 2025-05-28 SDOH — HEALTH STABILITY: PHYSICAL HEALTH: ON AVERAGE, HOW MANY DAYS PER WEEK DO YOU ENGAGE IN MODERATE TO STRENUOUS EXERCISE (LIKE A BRISK WALK)?: 0 DAYS

## 2025-05-28 SDOH — HEALTH STABILITY: PHYSICAL HEALTH: ON AVERAGE, HOW MANY MINUTES DO YOU ENGAGE IN EXERCISE AT THIS LEVEL?: 0 MIN

## 2025-05-28 ASSESSMENT — SOCIAL DETERMINANTS OF HEALTH (SDOH)
HOW HARD IS IT FOR YOU TO PAY FOR THE VERY BASICS LIKE FOOD, HOUSING, MEDICAL CARE, AND HEATING?: NOT HARD AT ALL
HOW OFTEN DO YOU ATTEND CHURCH OR RELIGIOUS SERVICES?: 1 TO 4 TIMES PER YEAR
HOW OFTEN DO YOU GET TOGETHER WITH FRIENDS OR RELATIVES?: MORE THAN THREE TIMES A WEEK
IN A TYPICAL WEEK, HOW MANY TIMES DO YOU TALK ON THE PHONE WITH FAMILY, FRIENDS, OR NEIGHBORS?: MORE THAN THREE TIMES A WEEK
HOW OFTEN DO YOU HAVE SIX OR MORE DRINKS ON ONE OCCASION: NEVER
WITHIN THE PAST 12 MONTHS, YOU WORRIED THAT YOUR FOOD WOULD RUN OUT BEFORE YOU GOT THE MONEY TO BUY MORE: NEVER TRUE
HOW OFTEN DO YOU ATTENT MEETINGS OF THE CLUB OR ORGANIZATION YOU BELONG TO?: NEVER
HOW MANY DRINKS CONTAINING ALCOHOL DO YOU HAVE ON A TYPICAL DAY WHEN YOU ARE DRINKING: 3 OR 4
HOW OFTEN DO YOU HAVE A DRINK CONTAINING ALCOHOL: 2-3 TIMES A WEEK
DO YOU BELONG TO ANY CLUBS OR ORGANIZATIONS SUCH AS CHURCH GROUPS UNIONS, FRATERNAL OR ATHLETIC GROUPS, OR SCHOOL GROUPS?: NO

## 2025-05-29 ENCOUNTER — TELEPHONE (OUTPATIENT)
Dept: CARDIOLOGY | Facility: MEDICAL CENTER | Age: 70
End: 2025-05-29
Payer: MEDICARE

## 2025-05-29 ENCOUNTER — APPOINTMENT (OUTPATIENT)
Dept: RADIOLOGY | Facility: MEDICAL CENTER | Age: 70
End: 2025-05-29
Attending: NURSE PRACTITIONER
Payer: MEDICARE

## 2025-05-29 NOTE — TELEPHONE ENCOUNTER
ADD    Caller: Mey Araujo    Topic/issue: Patient returned call. Patient said she has never seen a cardiologist before. She recently had lab work and an echocardiogram done with [facility].     Please advise.     Callback Number: [phone]    Thank you,     Milady IRVING

## 2025-05-30 ENCOUNTER — OFFICE VISIT (OUTPATIENT)
Dept: CARDIOLOGY | Facility: MEDICAL CENTER | Age: 70
End: 2025-05-30
Attending: INTERNAL MEDICINE
Payer: MEDICARE

## 2025-05-30 VITALS
HEART RATE: 63 BPM | DIASTOLIC BLOOD PRESSURE: 72 MMHG | BODY MASS INDEX: 31.58 KG/M2 | RESPIRATION RATE: 16 BRPM | HEIGHT: 64 IN | WEIGHT: 185 LBS | OXYGEN SATURATION: 99 % | SYSTOLIC BLOOD PRESSURE: 116 MMHG

## 2025-05-30 DIAGNOSIS — I77.89 ENLARGED THORACIC AORTA (HCC): ICD-10-CM

## 2025-05-30 DIAGNOSIS — Z01.810 PREOPERATIVE CARDIOVASCULAR EXAMINATION: ICD-10-CM

## 2025-05-30 DIAGNOSIS — E78.00 HYPERCHOLESTEROLEMIA: Primary | ICD-10-CM

## 2025-05-30 LAB — EKG IMPRESSION: NORMAL

## 2025-05-30 PROCEDURE — 99214 OFFICE O/P EST MOD 30 MIN: CPT | Performed by: INTERNAL MEDICINE

## 2025-05-30 PROCEDURE — 93005 ELECTROCARDIOGRAM TRACING: CPT | Mod: TC | Performed by: INTERNAL MEDICINE

## 2025-05-30 RX ORDER — PRAVASTATIN SODIUM 10 MG
10 TABLET ORAL NIGHTLY
Qty: 100 TABLET | Refills: 3 | Status: SHIPPED | OUTPATIENT
Start: 2025-05-30 | End: 2026-07-04

## 2025-05-30 ASSESSMENT — FIBROSIS 4 INDEX: FIB4 SCORE: 1.3

## 2025-05-30 NOTE — PROGRESS NOTES
"INTERVENTIONAL CARDIOLOGY NEW PATIENT CONSULTATION    PCP: FRANCY Gunn    1. Hypercholesterolemia    2. Enlarged thoracic aorta (HCC)    3. Preoperative cardiovascular examination        Mey Araujo has mild elevation of the ascending aorta at 42 mm.  She will have a thoracic MRA in 6 months and an echocardiogram in 1 year and see me after that.    She also has intermediate ASCVD risk.  I will have her start pravastatin 10 mg nightly.    She is at low risk for preoperative cardiovascular complications during endoscopy.  She may proceed without additional testing    Valvular insufficiency can be reassessed in 5 years.    History: Mey Araujo is a 70 y.o. female without significant cardiovascular history presenting for assessment of enlarged thoracic aorta.  She had an echocardiogram after a murmur was uncovered during evaluation with GI.  She has mild Payal valvular insufficiency and 42 mm ascending aorta.  She feels well aside from food sticking in the esophagus and being due for colonoscopy.      PE:  /72 (BP Location: Left arm, Patient Position: Sitting, BP Cuff Size: Adult)   Pulse 63   Resp 16   Ht 1.626 m (5' 4\")   Wt 83.9 kg (185 lb)   SpO2 99%   BMI 31.76 kg/m²   GEN: NAD  RESP: CTAB  CVS: RRR, No M/R/G  ABD: Soft, NT/ND  EXT: WWP, no edema         The 10-year ASCVD risk score ([place] DK, et al., 2019) is: 5.8%    Studies  Lab Results   Component Value Date/Time    CHOLSTRLTOT 187 05/14/2025 08:15 AM     (H) 05/14/2025 08:15 AM    HDL 68 05/14/2025 08:15 AM    TRIGLYCERIDE 88 05/14/2025 08:15 AM       Lab Results   Component Value Date/Time    SODIUM 143 05/14/2025 08:15 AM    POTASSIUM 4.8 05/14/2025 08:15 AM    CHLORIDE 109 05/14/2025 08:15 AM    CO2 26 05/14/2025 08:15 AM    GLUCOSE 95 05/14/2025 08:15 AM    BUN 19 05/14/2025 08:15 AM    CREATININE 0.78 05/14/2025 08:15 AM      No results found for: \"PROTHROMBTM\", \"INR\"   Lab Results   Component Value " Date/Time    WBC 4.9 05/14/2025 08:15 AM    RBC 4.61 05/14/2025 08:15 AM    HEMOGLOBIN 13.2 05/14/2025 08:15 AM    HEMATOCRIT 41.2 05/14/2025 08:15 AM    MCV 89.4 05/14/2025 08:15 AM    MCH 28.6 05/14/2025 08:15 AM    MCHC 32.0 (L) 05/14/2025 08:15 AM    MPV 11.4 05/14/2025 08:15 AM    NEUTSPOLYS 40.20 (L) 05/14/2025 08:15 AM    LYMPHOCYTES 41.90 (H) 05/14/2025 08:15 AM    MONOCYTES 8.50 05/14/2025 08:15 AM    EOSINOPHILS 8.00 (H) 05/14/2025 08:15 AM    BASOPHILS 1.20 05/14/2025 08:15 AM    HYPOCHROMIA 1+ 03/10/2016 02:30 PM        Past Medical History[1]  Past Surgical History[2]  Allergies[3]  Encounter Medications[4]  Social History     Socioeconomic History    Marital status:      Spouse name: Not on file    Number of children: Not on file    Years of education: Not on file    Highest education level: Some college, no degree   Occupational History    Not on file   Tobacco Use    Smoking status: Never    Smokeless tobacco: Never   Vaping Use    Vaping status: Never Used   Substance and Sexual Activity    Alcohol use: Yes     Alcohol/week: 1.8 oz     Types: 3 Glasses of wine per week     Comment: 2 glasses wine 3 d/wk    Drug use: No    Sexual activity: Yes     Partners: Male   Other Topics Concern    Not on file   Social History Narrative    Not on file     Social Drivers of Health     Financial Resource Strain: Low Risk  (5/27/2025)    Overall Financial Resource Strain (CARDIA)     Difficulty of Paying Living Expenses: Not hard at all   Food Insecurity: No Food Insecurity (5/27/2025)    Hunger Vital Sign     Worried About Running Out of Food in the Last Year: Never true     Ran Out of Food in the Last Year: Never true   Transportation Needs: No Transportation Needs (5/27/2025)    PRAPARE - Transportation     Lack of Transportation (Medical): No     Lack of Transportation (Non-Medical): No   Physical Activity: Inactive (5/27/2025)    Exercise Vital Sign     Days of Exercise per Week: 0 days     Minutes of  Exercise per Session: 0 min   Stress: Stress Concern Present (5/27/2025)    [employer] [place] [employer] - Occupational Stress Questionnaire     Feeling of Stress : Very much   Social Connections: Moderately Integrated (5/27/2025)    Social Connection and Isolation Panel [NHANES]     Frequency of Communication with Friends and Family: More than three times a week     Frequency of Social Gatherings with Friends and Family: More than three times a week     Attends Hinduism Services: 1 to 4 times per year     Active Member of Clubs or Organizations: No     Attends Club or Organization Meetings: Never     Marital Status:    Intimate Partner Violence: Not on file   Housing Stability: Low Risk  (5/27/2025)    Housing Stability Vital Sign     Unable to Pay for Housing in the Last Year: No     Number of Times Moved in the Last Year: 0     Homeless in the Last Year: No     Family History   Problem Relation Age of Onset    Cancer Father         prostate ca, skin cancer.     Lung Disease Father         pulmonary fibrosis    Other Sister         sleep apnea    Psychiatric Illness Sister         depression       Chief Complaint   Patient presents with    Hypercholesterolemia Follow-up       ROS:   10 point review systems is otherwise negative except as per the HPI         [1]   Past Medical History:  Diagnosis Date    Allergic rhinitis     Kidney stone     Moderate episode of recurrent major depressive disorder (HCC)     Osteopenia 11/08/2019    Sleep apnea    [2]   Past Surgical History:  Procedure Laterality Date    AK REPAIR NON/MALUNION METATARSAL Left 11/8/2023    Procedure: LEFT SECOND METATARSAL NONUNION REPAIR;  Surgeon: Getachew Morgan M.D.;  Location: [facility];  Service: Orthopedics    PB FUSION FOOT BONE,MIDTARSAL,1 JT Left 11/8/2023    Procedure: POSSIBLE LEFT SECOND TARSOMETATARSAL FUSION, REPAIRS AS INDICATED;  Surgeon: Getachew Morgan M.D.;  Location: [facility];  Service: Orthopedics    NEPHROLITHOTOMY      SINUSCOPE      TONSILLECTOMY      TONSILLECTOMY AND ADENOIDECTOMY     [3]   Allergies  Allergen Reactions    Pcn [Penicillins] Rash     Rash      Avocado Diarrhea     Nausea, diarrhea, vomiting     Hydrocodone      Itchy nose     [4]   Outpatient Encounter Medications as of 5/30/2025   Medication Sig Dispense Refill    omeprazole (PRILOSEC) 20 MG delayed-release capsule TAKE 1 CAPSULE BY MOUTH DAILY 30 Capsule 5    oxybutynin SR (DITROPAN-XL) 10 MG CR tablet TAKE 1 TABLET BY MOUTH DAILY 90 Tablet 0    [DISCONTINUED] oxybutynin SR (DITROPAN-XL) 10 MG CR tablet Take 1 Tablet by mouth every day. 100 Tablet 0    [DISCONTINUED] sertraline (ZOLOFT) 25 MG tablet Take 1 Tablet by mouth every evening. (Patient not taking: Reported on 5/8/2025) 90 Tablet 3     No facility-administered encounter medications on file as of 5/30/2025.

## 2025-06-02 ENCOUNTER — APPOINTMENT (OUTPATIENT)
Dept: MEDICAL GROUP | Facility: IMAGING CENTER | Age: 70
End: 2025-06-02
Payer: MEDICARE

## 2025-06-02 VITALS
SYSTOLIC BLOOD PRESSURE: 118 MMHG | WEIGHT: 183 LBS | DIASTOLIC BLOOD PRESSURE: 66 MMHG | HEIGHT: 64 IN | HEART RATE: 64 BPM | TEMPERATURE: 97.6 F | OXYGEN SATURATION: 96 % | BODY MASS INDEX: 31.24 KG/M2

## 2025-06-02 DIAGNOSIS — F33.1 MAJOR DEPRESSIVE DISORDER, RECURRENT, MODERATE (HCC): ICD-10-CM

## 2025-06-02 DIAGNOSIS — N39.41 URGE INCONTINENCE OF URINE: ICD-10-CM

## 2025-06-02 DIAGNOSIS — K21.9 GASTROESOPHAGEAL REFLUX DISEASE, UNSPECIFIED WHETHER ESOPHAGITIS PRESENT: ICD-10-CM

## 2025-06-02 DIAGNOSIS — F41.1 GAD (GENERALIZED ANXIETY DISORDER): Primary | ICD-10-CM

## 2025-06-02 DIAGNOSIS — E78.00 HYPERCHOLESTEROLEMIA: ICD-10-CM

## 2025-06-02 PROCEDURE — 3074F SYST BP LT 130 MM HG: CPT | Performed by: NURSE PRACTITIONER

## 2025-06-02 PROCEDURE — 99214 OFFICE O/P EST MOD 30 MIN: CPT | Performed by: NURSE PRACTITIONER

## 2025-06-02 PROCEDURE — 3078F DIAST BP <80 MM HG: CPT | Performed by: NURSE PRACTITIONER

## 2025-06-02 RX ORDER — OXYBUTYNIN CHLORIDE 10 MG/1
10 TABLET, EXTENDED RELEASE ORAL DAILY
Qty: 100 TABLET | Refills: 3 | Status: SHIPPED | OUTPATIENT
Start: 2025-06-02

## 2025-06-02 RX ORDER — PRAVASTATIN SODIUM 10 MG
10 TABLET ORAL NIGHTLY
Qty: 100 TABLET | Refills: 3 | Status: SHIPPED | OUTPATIENT
Start: 2025-06-02 | End: 2026-07-07

## 2025-06-02 RX ORDER — OMEPRAZOLE 20 MG/1
20 CAPSULE, DELAYED RELEASE ORAL DAILY
Qty: 30 CAPSULE | Refills: 5 | Status: SHIPPED | OUTPATIENT
Start: 2025-06-02

## 2025-06-02 ASSESSMENT — PATIENT HEALTH QUESTIONNAIRE - PHQ9
1. LITTLE INTEREST OR PLEASURE IN DOING THINGS: NOT AT ALL
5. POOR APPETITE OR OVEREATING: NOT AT ALL
4. FEELING TIRED OR HAVING LITTLE ENERGY: NOT AT ALL
SUM OF ALL RESPONSES TO PHQ QUESTIONS 1-9: 0
CLINICAL INTERPRETATION OF PHQ2 SCORE: 0
9. THOUGHTS THAT YOU WOULD BE BETTER OFF DEAD, OR OF HURTING YOURSELF: NOT AT ALL
6. FEELING BAD ABOUT YOURSELF - OR THAT YOU ARE A FAILURE OR HAVE LET YOURSELF OR YOUR FAMILY DOWN: NOT AL ALL
SUM OF ALL RESPONSES TO PHQ9 QUESTIONS 1 AND 2: 0
2. FEELING DOWN, DEPRESSED, IRRITABLE, OR HOPELESS: NOT AT ALL
3. TROUBLE FALLING OR STAYING ASLEEP OR SLEEPING TOO MUCH: NOT AT ALL
7. TROUBLE CONCENTRATING ON THINGS, SUCH AS READING THE NEWSPAPER OR WATCHING TELEVISION: NOT AT ALL
8. MOVING OR SPEAKING SO SLOWLY THAT OTHER PEOPLE COULD HAVE NOTICED. OR THE OPPOSITE, BEING SO FIGETY OR RESTLESS THAT YOU HAVE BEEN MOVING AROUND A LOT MORE THAN USUAL: NOT AT ALL

## 2025-06-02 ASSESSMENT — ACTIVITIES OF DAILY LIVING (ADL): BATHING_REQUIRES_ASSISTANCE: 0

## 2025-06-02 ASSESSMENT — FIBROSIS 4 INDEX: FIB4 SCORE: 1.3

## 2025-06-02 ASSESSMENT — ENCOUNTER SYMPTOMS: GENERAL WELL-BEING: GOOD

## 2025-06-02 NOTE — PROGRESS NOTES
Chief Complaint   Patient presents with    Annual Exam     Has seen a cardiologist and may have an aneurysm       HPI:  Mey Araujo is a 70 y.o. here for Medicare Annual Wellness Visit     Patient Active Problem List    Diagnosis Date Noted    Enlarged thoracic aorta (HCC) 05/30/2025    Pain of left foot 10/24/2023    Treatment-emergent central sleep apnea 10/16/2023    Subacute cough 08/23/2023    Urge incontinence of urine 07/24/2023    Left foot pain 02/03/2023    Urinary incontinence 01/25/2023    Major depressive disorder, recurrent, moderate (HCC) 09/30/2022    RICHARD (generalized anxiety disorder) 09/30/2022    BMI 29.0-29.9,adult 04/11/2022    Overweight with body mass index (BMI) 25.0-29.9 04/11/2022    Syncope 04/11/2022    Dizziness 10/28/2021    Hypercholesterolemia 02/22/2021    Plantar fasciitis 02/22/2021    DONN (obstructive sleep apnea) 05/12/2020    Non-smoker 05/12/2020    Nocturnal hypoxia 12/06/2019    Vitamin D insufficiency 12/06/2019    Snoring 11/08/2019    Finger pain, left 11/08/2019    Swelling of finger joint of left hand 11/08/2019    Osteopenia of multiple sites 11/08/2019    Preoperative cardiovascular examination 04/06/2016    History of kidney stones 04/06/2016    Diarrhea of presumed infectious origin 03/12/2016    Headache 03/10/2016    History of pneumonia 03/10/2016       Current Medications[1]       Current supplements as per medication list.     Allergies: Pcn [penicillins], Avocado, and Hydrocodone    Current social contact/activities: ***     She  reports that she has never smoked. She has never used smokeless tobacco. She reports current alcohol use of about 1.8 oz of alcohol per week. She reports that she does not use drugs.  Counseling given: Not Answered      ROS:    Gait: Uses no assistive device  Ostomy: No  Other tubes: No  Amputations: No  Chronic oxygen use: No  Last eye exam: 5 months ago  Wears hearing aids: No   : Reports urinary leakage during the last 6  months that has not interfered at all with their daily activities or sleep.    Screening:  ***  Depression Screening  Little interest or pleasure in doing things?  0 - not at all  Feeling down, depressed , or hopeless? 0 - not at all  Patient Health Questionnaire Score: 0     If depressive symptoms identified deferred to follow up visit unless specifically addressed in assessment and plan.    Interpretation of PHQ-9 Total Score   Score Severity   1-4 No Depression   5-9 Mild Depression   10-14 Moderate Depression   15-19 Moderately Severe Depression   20-27 Severe Depression    Screening for Cognitive Impairment  Do you or any of your friends or family members have any concern about your memory?    Three Minute Recall (Village, Kitchen, Baby) 3/3    Robert clock face with all 12 numbers and set the hands to show 10 minutes past 11.  Yes    Cognitive concerns identified deferred for follow up unless specifically addressed in assessment and plan.    Fall Risk Assessment  Has the patient had two or more falls in the last year or any fall with injury in the last year?  No    Safety Assessment  Do you always wear your seatbelt?  Yes  Any changes to home needed to function safely? No  Difficulty hearing.  No  Patient counseled about all safety risks that were identified.    Functional Assessment ADLs  Are there any barriers preventing you from cooking for yourself or meeting nutritional needs?  No.    Are there any barriers preventing you from driving safely or obtaining transportation?  No.    Are there any barriers preventing you from using a telephone or calling for help?  No    Are there any barriers preventing you from shopping?  No.    Are there any barriers preventing you from taking care of your own finances?  No    Are there any barriers preventing you from managing your medications?  No    Are there any barriers preventing you from showering, bathing or dressing yourself? No    Are there any barriers preventing  you from doing housework or laundry? No  Are there any barriers preventing you from using the toilet?No  Are you currently engaging in any exercise or physical activity?  Yes.      Self-Assessment of Health  What is your perception of your health? Good    Do you sleep more than six hours a night? Yes    In the past 7 days, how much did pain keep you from doing your normal work? None    Do you spend quality time with family or friends (virtually or in person)? Yes    Do you usually eat a heart healthy diet that constists of a variety of fruits, vegetables, whole grains and fiber? Yes    Do you eat foods high in fat and/or Fast Food more than three times per week? No    How concerned are you that your medical conditions are not being well managed? a little    Are you worried that in the next 2 months, you may not have stable housing that you own, rent, or stay in as part of a household? No      Advance Care Planning  Do you have an Advance Directive, Living Will, Durable Power of , or POLST? Yes    Living Will     is not on file - instructed patient to bring in a copy to scan into their chart      Health Maintenance Summary            Current Care Gaps       Colorectal Cancer Screening (Colonoscopy - Every 10 Years) Overdue since 6/10/2024      06/10/2014  Colonoscopy (Done)              COVID-19 Vaccine (3 - 2024-25 season) Overdue since 9/1/2024 03/17/2021  Imm Admin: PFIZER PURPLE CAP SARS-COV-2 VACCINATION (12+)    02/13/2021  Imm Admin: PFIZER PURPLE CAP SARS-COV-2 VACCINATION (12+)              Bone Density Scan (Every 2 Years) Overdue since 5/10/2025      01/10/2025  Order placed for DS-BONE DENSITY STUDY (DEXA) by FRANCY Gunn    05/10/2023  DS-BONE DENSITY STUDY (DEXA)    01/30/2020  DS-BONE DENSITY STUDY (DEXA)    04/20/2007  DS-BONE DENSITY STUDY (DEXA)                      Needs Review       Hepatitis C Screening  Tentatively Complete      11/13/2019  HEP C VIRUS ANTIBODY               Mammogram (Yearly) Tentatively due on 5/21/2026 05/21/2025  MA-SCREENING MAMMO BILAT W/TOMOSYNTHESIS W/CAD    05/15/2024  MA-SCREENING MAMMO BILAT W/TOMOSYNTHESIS W/CAD    05/10/2023  MA-SCREENING MAMMO BILAT W/TOMOSYNTHESIS W/CAD    02/04/2022  MA-SCREENING MAMMO BILAT W/TOMOSYNTHESIS W/CAD    02/01/2021  MA-SCREENING MAMMO BILAT W/TOMOSYNTHESIS W/CAD     Only the first 5 history entries have been loaded, but more history exists.                    Upcoming       Annual Wellness Visit (Yearly) Next due on 5/8/2026 05/08/2025  Level of Service: VA ANNUAL WELLNESS VISIT-INCLUDES PPPS SUBSEQUE*    02/28/2024  Level of Service: VA ANNUAL WELLNESS VISIT-INCLUDES PPPS SUBSEQUE*    01/25/2023  Level of Service: VA ANNUAL WELLNESS VISIT-INCLUDES PPPS SUBSEQUE*    04/11/2022  Level of Service: VA ANNUAL WELLNESS VISIT-INCLUDES PPPS SUBSEQUE*              IMM DTaP/Tdap/Td Vaccine (2 - Td or Tdap) Next due on 12/6/2029 12/06/2019  Imm Admin: Tdap Vaccine                      Completed or No Longer Recommended       Influenza Vaccine (Series Information) Completed      10/24/2024  Imm Admin: Influenza high-dose trivalent (PF)    10/02/2023  Imm Admin: Influenza Vaccine Adult HD    09/26/2022  Imm Admin: Influenza Vaccine Adult HD    10/07/2019  Imm Admin: Influenza Vaccine Quad Inj (Pf)    10/07/2019  Imm Admin: Influenza Vaccine Quad Inj (Preserved)      Only the first 5 history entries have been loaded, but more history exists.              Zoster (Shingles) Vaccines (Series Information) Completed      01/31/2020  Imm Admin: Zoster Vaccine Recombinant (RZV) (SHINGRIX)    12/06/2019  Imm Admin: Zoster Vaccine Recombinant (RZV) (SHINGRIX)              Pneumococcal Vaccine: 50+ Years (Series Information) Completed      09/05/2023  Imm Admin: Pneumococcal Conjugate Vaccine (PCV20)    08/23/2023  Imm Admin: Pneumococcal Conjugate Vaccine (PCV20)    04/07/2022  Imm Admin: Pneumococcal polysaccharide vaccine  "(PPSV-23)              Hepatitis A Vaccine (Hep A) (Series Information) Aged Out      No completion history exists for this topic.              Hepatitis B Vaccine (Hep B) (Series Information) Aged Out     No completion history exists for this topic.              HPV Vaccines (Series Information) Aged Out     No completion history exists for this topic.              Polio Vaccine (Inactivated Polio) (Series Information) Aged Out     No completion history exists for this topic.              Meningococcal Immunization (Series Information) Aged Out     No completion history exists for this topic.              Meningococcal B Vaccine (Series Information) Aged Out     No completion history exists for this topic.              Cervical Cancer Screening  Discontinued        Frequency changed to Never automatically (Topic No Longer Applies)    05/20/2020  PAP IG (IMAGE GUIDED)    06/06/2014  Done                            Patient Care Team:  FRANCY Gunn as PCP - General (Family Medicine)  FRANCY uGnn as PCP - HTH Paneled (Family Medicine)  Preferred home care  as Respiratory Therapist (DME Supplier)        Social History[2]  Family History   Problem Relation Age of Onset    Cancer Father         prostate ca, skin cancer.     Lung Disease Father         pulmonary fibrosis    Other Sister         sleep apnea    Psychiatric Illness Sister         depression     She  has a past medical history of Allergic rhinitis, Heart murmur (05/30/2025), Kidney stone, Moderate episode of recurrent major depressive disorder (HCC), Osteopenia (11/08/2019), and Sleep apnea.   Past Surgical History[3]    Exam:   /66 (BP Location: Right arm, Patient Position: Sitting, BP Cuff Size: Large adult)   Pulse 64   Temp 36.4 °C (97.6 °F) (Temporal)   Ht 1.626 m (5' 4\")   Wt 83 kg (183 lb)   SpO2 96%  Body mass index is 31.41 kg/m².    Hearing {GOOD/FAIR/POOR/EXCELLENT:45446}.    Dentition {DENTITION:08753}  Alert, " oriented in no acute distress.  Eye contact is good, speech goal directed, affect calm    Assessment and Plan. The following treatment and monitoring plan is recommended:  ***  1. Urge incontinence of urine    2. Hypercholesterolemia    3. Gastroesophageal reflux disease, unspecified whether esophagitis present      Services suggested: { AWV COORDINATION OF SERVICES:20405}  Health Care Screening: Age-appropriate preventive services recommended by USPTF and ACIP covered by Medicare were discussed today. Services ordered if indicated and agreed upon by the patient.  Referrals offered: Community-based lifestyle interventions to reduce health risks and promote self-management and wellness, fall prevention, nutrition, physical activity, tobacco-use cessation, weight loss, and mental health services as per orders if indicated.    Discussion today about general wellness and lifestyle habits:    Prevent falls and reduce trip hazards; Cautioned about securing or removing rugs.  Have a working fire alarm and carbon monoxide detector;   Engage in regular physical activity and social activities     Follow-up: No follow-ups on file.         [1]   Current Outpatient Medications   Medication Sig Dispense Refill    pravastatin (PRAVACHOL) 10 MG Tab Take 1 Tablet by mouth every evening. 100 Tablet 3    omeprazole (PRILOSEC) 20 MG delayed-release capsule TAKE 1 CAPSULE BY MOUTH DAILY 30 Capsule 5    oxybutynin SR (DITROPAN-XL) 10 MG CR tablet TAKE 1 TABLET BY MOUTH DAILY 90 Tablet 0     No current facility-administered medications for this visit.   [2]   Social History  Tobacco Use    Smoking status: Never    Smokeless tobacco: Never   Vaping Use    Vaping status: Never Used   Substance Use Topics    Alcohol use: Yes     Alcohol/week: 1.8 oz     Types: 3 Glasses of wine per week     Comment: 2 glasses wine 3 d/wk    Drug use: No   [3]   Past Surgical History:  Procedure Laterality Date    [facility] REPAIR NON/MALUNION METATARSAL Left  11/8/2023    Procedure: LEFT SECOND METATARSAL NONUNION REPAIR;  Surgeon: Getachew Morgan M.D.;  Location: [facility] [place];  Service: Orthopedics    PB FUSION FOOT BONE,MIDTARSAL,1 JT Left 11/8/2023    Procedure: POSSIBLE LEFT SECOND TARSOMETATARSAL FUSION, REPAIRS AS INDICATED;  Surgeon: Getachew Morgan M.D.;  Location: [facility];  Service: Orthopedics    NEPHROLITHOTOMY      SINUSCOPE      TONSILLECTOMY      TONSILLECTOMY AND ADENOIDECTOMY

## 2025-06-02 NOTE — PROGRESS NOTES
Subjective:     History of Present Illness  The patient presents for evaluation of aortic aneurysm, anxiety, and elevated cholesterol.    She was recently diagnosed with an aortic aneurysm measuring 4.2 cm, a condition she has not previously experienced. An echocardiogram was performed, revealing the aneurysm, and she was subsequently referred to a cardiologist. The cardiologist has ordered an MR chest to further investigate the aneurysm, suspecting either a misinterpretation of the echocardiogram or a congenital enlargement of the aortic valve. She is concerned about this diagnosis and the heart murmur. The cardiologist has reassured her that the mild valve changes are age-appropriate and has initiated statin therapy due to slightly elevated cholesterol levels. She has gained weight due to stress and is working on her diet. She feels like her heart condition is related to her weight, drinking, and stress. She has committed to adopting a Mediterranean diet and reducing her alcohol consumption.  She has a follow-up appointment with the cardiologist in 1 year, but this may be cancelled if the MRI results are normal. She will continue the statin regardless of the MRI results.    She is currently caring for her mother who has Alzheimer's disease, which has been causing her significant stress and anxiety. She reports experiencing panic attacks, during which she consumes alcohol to calm herself. She also reports experiencing palpitations and difficulty being around her mother. She is not currently participating in any support groups for caregivers of individuals with dementia but is considering joining one at [facility]. She has been prescribed sertraline for anxiety but reports that it has not been effective in managing her symptoms. She has been consuming 3 glasses of wine nightly but is attempting to reduce this to 1 or 2 glasses on Fridays and Saturdays only.    She takes calcium supplements daily and is scheduled  "for a colonoscopy. She had to cancel her DEXA scan due to her mother's condition and needs to reschedule it.    SOCIAL HISTORY  The patient admits to drinking 3 glasses of wine every night to calm down from the stress of caring for her mother with Alzheimer's.    FAMILY HISTORY  Her mother has Alzheimer's disease.      Current medicines (including changes today)  Current Medications[1]  She  has a past medical history of Allergic rhinitis, Heart murmur (05/30/2025), Kidney stone, Moderate episode of recurrent major depressive disorder (HCC), Osteopenia (11/08/2019), and Sleep apnea.    ROS   No chest pain, no shortness of breath, no abdominal pain  Positive ROS as per HPI.  All other systems reviewed and are negative.     Objective:     /66 (BP Location: Right arm, Patient Position: Sitting, BP Cuff Size: Large adult)   Pulse 64   Temp 36.4 °C (97.6 °F) (Temporal)   Ht 1.626 m (5' 4\")   Wt 83 kg (183 lb)   SpO2 96%  Body mass index is 31.41 kg/m².   Physical Exam    Skin: No abnormalities, no rashes or lesions  Constitutional: Alert, no distress.  Skin: Warm, dry, good turgor, no rashes in visible areas.  Eye: Equal, round and reactive, conjunctiva clear, lids normal.  ENMT: Lips without lesions, good dentition  Respiratory: Unlabored respiratory effort  Psych: Alert and oriented x3, normal affect and mood.      Results  Labs   - Cholesterol: Slightly elevated   - Vitamin D: Normal   - Thyroid levels: Normal    Imaging   - Echocardiogram: 4.2 aneurysm        Assessment and Plan:   The following treatment plan was discussed    Assessment & Plan  1. Aortic aneurysm.  - The patient has a 4.2 cm aneurysm detected on an echocardiogram.  - The cardiologist will order an MRI to determine if the echocardiogram was read incorrectly, if there is an aneurysm, or if she was born with a large aortic valve.  - She was informed that even if an aneurysm is present, it would take approximately 13 years to grow to a size " requiring intervention.  - She was started on a statin due to slightly elevated cholesterol levels. If muscle aches occur, she should stop the statin for 2 weeks and then restart it. If aches recur, the medication will be changed.    2. Anxiety.  - The patient experiences significant anxiety and stress related to caring for her mother with Alzheimer's disease.  - She reports that sertraline at the lowest dose has not been effective in managing her symptoms and has stopped this.   - She was advised to join support groups for caregivers of individuals with dementia and to continue efforts to reduce alcohol consumption. Info sent to patient via [employer]  - If anxiety symptoms persist or worsen, alternative treatments will be considered.    3. Elevated cholesterol.  - The patient was started on a statin due to slightly elevated cholesterol levels.  - She was advised to take the statin at night     4. Health maintenance.  - The patient was advised to schedule a colonoscopy now that she has been cleared by the cardiologist.  - She was also reminded to reschedule her DEXA scan, which was previously canceled due to caregiving responsibilities.  - Her vitamin D and thyroid levels are within normal range.  - She takes calcium supplements daily.    Follow-up  The patient will follow up on 06/26/2025 for her annual examination.      ORDERS:  1. RICHARD (generalized anxiety disorder) (Primary)    2. Major depressive disorder, recurrent, moderate (HCC)    3. Urge incontinence of urine  - oxybutynin SR (DITROPAN-XL) 10 MG CR tablet; Take 1 Tablet by mouth every day.  Dispense: 100 Tablet; Refill: 3    4. Hypercholesterolemia  - pravastatin (PRAVACHOL) 10 MG Tab; Take 1 Tablet by mouth every evening.  Dispense: 100 Tablet; Refill: 3    5. Gastroesophageal reflux disease, unspecified whether esophagitis present  - omeprazole (PRILOSEC) 20 MG delayed-release capsule; Take 1 Capsule by mouth every day.  Dispense: 30 Capsule; Refill:  5          The MA is performing the above orders under the direction of Dr. Benitez or Dr. Bullock    Please note that this dictation was created using voice recognition software. I have made every reasonable attempt to correct obvious errors, but I expect that there are errors of grammar and possibly content that I did not discover before finalizing the note.      Attestation      Verbal consent was acquired by the patient to use [employer] ambient listening note generation during this visit Yes                  [1]   Current Outpatient Medications   Medication Sig Dispense Refill    oxybutynin SR (DITROPAN-XL) 10 MG CR tablet Take 1 Tablet by mouth every day. 100 Tablet 3    pravastatin (PRAVACHOL) 10 MG Tab Take 1 Tablet by mouth every evening. 100 Tablet 3    omeprazole (PRILOSEC) 20 MG delayed-release capsule Take 1 Capsule by mouth every day. 30 Capsule 5     No current facility-administered medications for this visit.

## 2025-06-04 ENCOUNTER — TELEPHONE (OUTPATIENT)
Dept: HEALTH INFORMATION MANAGEMENT | Facility: OTHER | Age: 70
End: 2025-06-04
Payer: MEDICARE

## 2025-06-26 ENCOUNTER — APPOINTMENT (OUTPATIENT)
Dept: MEDICAL GROUP | Facility: IMAGING CENTER | Age: 70
End: 2025-06-26
Payer: MEDICARE

## 2025-06-26 SDOH — HEALTH STABILITY: PHYSICAL HEALTH: ON AVERAGE, HOW MANY MINUTES DO YOU ENGAGE IN EXERCISE AT THIS LEVEL?: 0 MIN

## 2025-06-26 SDOH — HEALTH STABILITY: PHYSICAL HEALTH: ON AVERAGE, HOW MANY DAYS PER WEEK DO YOU ENGAGE IN MODERATE TO STRENUOUS EXERCISE (LIKE A BRISK WALK)?: 0 DAYS

## 2025-06-26 ASSESSMENT — SOCIAL DETERMINANTS OF HEALTH (SDOH)
IN A TYPICAL WEEK, HOW MANY TIMES DO YOU TALK ON THE PHONE WITH FAMILY, FRIENDS, OR NEIGHBORS?: MORE THAN THREE TIMES A WEEK
HOW OFTEN DO YOU ATTEND CHURCH OR RELIGIOUS SERVICES?: 1 TO 4 TIMES PER YEAR
HOW OFTEN DO YOU GET TOGETHER WITH FRIENDS OR RELATIVES?: MORE THAN THREE TIMES A WEEK
WITHIN THE PAST 12 MONTHS, YOU WORRIED THAT YOUR FOOD WOULD RUN OUT BEFORE YOU GOT THE MONEY TO BUY MORE: NEVER TRUE
HOW OFTEN DO YOU HAVE A DRINK CONTAINING ALCOHOL: 2-3 TIMES A WEEK
DO YOU BELONG TO ANY CLUBS OR ORGANIZATIONS SUCH AS CHURCH GROUPS UNIONS, FRATERNAL OR ATHLETIC GROUPS, OR SCHOOL GROUPS?: NO
HOW OFTEN DO YOU HAVE SIX OR MORE DRINKS ON ONE OCCASION: NEVER
HOW OFTEN DO YOU ATTENT MEETINGS OF THE CLUB OR ORGANIZATION YOU BELONG TO?: NEVER
HOW HARD IS IT FOR YOU TO PAY FOR THE VERY BASICS LIKE FOOD, HOUSING, MEDICAL CARE, AND HEATING?: NOT HARD AT ALL
HOW MANY DRINKS CONTAINING ALCOHOL DO YOU HAVE ON A TYPICAL DAY WHEN YOU ARE DRINKING: 3 OR 4

## 2025-07-22 ENCOUNTER — HOSPITAL ENCOUNTER (OUTPATIENT)
Dept: RADIOLOGY | Facility: MEDICAL CENTER | Age: 70
End: 2025-07-22
Attending: INTERNAL MEDICINE
Payer: MEDICARE

## 2025-07-24 ENCOUNTER — HOSPITAL ENCOUNTER (OUTPATIENT)
Dept: RADIOLOGY | Facility: MEDICAL CENTER | Age: 70
End: 2025-07-24
Attending: INTERNAL MEDICINE
Payer: MEDICARE

## 2025-07-24 DIAGNOSIS — I77.89 ENLARGED THORACIC AORTA (HCC): ICD-10-CM

## 2025-07-24 PROCEDURE — 700117 HCHG RX CONTRAST REV CODE 255: Mod: JZ | Performed by: INTERNAL MEDICINE

## 2025-07-24 PROCEDURE — A9579 GAD-BASE MR CONTRAST NOS,1ML: HCPCS | Mod: JZ | Performed by: INTERNAL MEDICINE

## 2025-07-24 PROCEDURE — 71552 MRI CHEST W/O & W/DYE: CPT

## 2025-07-24 RX ORDER — GADOTERIDOL 279.3 MG/ML
18 INJECTION INTRAVENOUS ONCE
Status: COMPLETED | OUTPATIENT
Start: 2025-07-24 | End: 2025-07-24

## 2025-07-24 RX ADMIN — GADOTERIDOL 18 ML: 279.3 INJECTION, SOLUTION INTRAVENOUS at 09:11

## 2025-07-28 ENCOUNTER — TELEPHONE (OUTPATIENT)
Dept: CARDIOLOGY | Facility: MEDICAL CENTER | Age: 70
End: 2025-07-28
Payer: MEDICARE

## 2025-07-28 NOTE — TELEPHONE ENCOUNTER
[facility]    Caller: Mey Araujo     Topic/issue: Patient states her MRI results just came back and she would like a callback to discuss what they were looking for. Also if she can continue taking her pravastatin (PRAVACHOL) 10 MG Tab [[id number]] based on the results of that MRI. Please advise.    Callback Number: [phone]     Thank you,  Mahamed LEGER

## 2025-07-28 NOTE — TELEPHONE ENCOUNTER
Phone Number Called: [phone]      Call outcome: Spoke to patient regarding message below.    Message: Called to inform patient that we will reach out in regards to her imaging once [facility] has reviewed it. Reinforced that patient should continue taking the pravastatin.

## 2025-08-20 ENCOUNTER — TELEPHONE (OUTPATIENT)
Dept: CARDIOLOGY | Facility: MEDICAL CENTER | Age: 70
End: 2025-08-20
Payer: MEDICARE

## 2025-10-20 ENCOUNTER — APPOINTMENT (OUTPATIENT)
Dept: MEDICAL GROUP | Facility: IMAGING CENTER | Age: 70
End: 2025-10-20
Payer: MEDICARE